# Patient Record
Sex: FEMALE | Race: OTHER | HISPANIC OR LATINO
[De-identification: names, ages, dates, MRNs, and addresses within clinical notes are randomized per-mention and may not be internally consistent; named-entity substitution may affect disease eponyms.]

---

## 2017-03-20 ENCOUNTER — TRANSCRIPTION ENCOUNTER (OUTPATIENT)
Age: 32
End: 2017-03-20

## 2017-03-27 ENCOUNTER — TRANSCRIPTION ENCOUNTER (OUTPATIENT)
Age: 32
End: 2017-03-27

## 2017-06-23 ENCOUNTER — TRANSCRIPTION ENCOUNTER (OUTPATIENT)
Age: 32
End: 2017-06-23

## 2017-09-06 ENCOUNTER — TRANSCRIPTION ENCOUNTER (OUTPATIENT)
Age: 32
End: 2017-09-06

## 2017-09-28 ENCOUNTER — TRANSCRIPTION ENCOUNTER (OUTPATIENT)
Age: 32
End: 2017-09-28

## 2017-10-01 ENCOUNTER — EMERGENCY (EMERGENCY)
Facility: HOSPITAL | Age: 32
LOS: 1 days | Discharge: HOME | End: 2017-10-01
Attending: EMERGENCY MEDICINE | Admitting: EMERGENCY MEDICINE
Payer: MEDICAID

## 2017-10-01 VITALS
TEMPERATURE: 98 F | WEIGHT: 240.08 LBS | HEART RATE: 60 BPM | DIASTOLIC BLOOD PRESSURE: 89 MMHG | HEIGHT: 65 IN | SYSTOLIC BLOOD PRESSURE: 131 MMHG | RESPIRATION RATE: 18 BRPM | OXYGEN SATURATION: 99 %

## 2017-10-01 PROCEDURE — 99284 EMERGENCY DEPT VISIT MOD MDM: CPT

## 2017-10-01 PROCEDURE — 96375 TX/PRO/DX INJ NEW DRUG ADDON: CPT

## 2017-10-01 PROCEDURE — 99284 EMERGENCY DEPT VISIT MOD MDM: CPT | Mod: 25

## 2017-10-01 PROCEDURE — 96374 THER/PROPH/DIAG INJ IV PUSH: CPT

## 2017-10-01 RX ORDER — METOCLOPRAMIDE HCL 10 MG
10 TABLET ORAL ONCE
Qty: 0 | Refills: 0 | Status: COMPLETED | OUTPATIENT
Start: 2017-10-01 | End: 2017-10-01

## 2017-10-01 RX ORDER — KETOROLAC TROMETHAMINE 30 MG/ML
30 SYRINGE (ML) INJECTION ONCE
Qty: 0 | Refills: 0 | Status: DISCONTINUED | OUTPATIENT
Start: 2017-10-01 | End: 2017-10-01

## 2017-10-01 RX ORDER — SODIUM CHLORIDE 9 MG/ML
2000 INJECTION INTRAMUSCULAR; INTRAVENOUS; SUBCUTANEOUS ONCE
Qty: 0 | Refills: 0 | Status: COMPLETED | OUTPATIENT
Start: 2017-10-01 | End: 2017-10-01

## 2017-10-01 RX ADMIN — Medication 10 MILLIGRAM(S): at 23:37

## 2017-10-01 RX ADMIN — SODIUM CHLORIDE 2000 MILLILITER(S): 9 INJECTION INTRAMUSCULAR; INTRAVENOUS; SUBCUTANEOUS at 23:36

## 2017-10-01 NOTE — ED PROVIDER NOTE - OBJECTIVE STATEMENT
31 y/o F presents to ED c/o headache since this morning. Pt states she took Excedrin to no relief. She notes she began feeling numbness to the top of the R side of her head which prompted her visit to the ED today. Denies fever, vomiting, hx of similar sx or any other complaints at this time.

## 2017-10-01 NOTE — ED PROVIDER NOTE - PROGRESS NOTE DETAILS
pt is feeling much better will d/c home. pt currently is improved without sx will d/c home . pt told to f/u with neuro

## 2017-10-01 NOTE — ED ADULT NURSE NOTE - CHPI ED SYMPTOMS NEG
no weakness/no blurred vision/no fever/no vomiting/no nausea/no loss of consciousness/no change in level of consciousness/no confusion/no dizziness

## 2017-10-01 NOTE — ED ADULT NURSE NOTE - OBJECTIVE STATEMENT
Assumed pt care at 2240.  Pt awake alert and oriented x3 c/o headache. Pt states headache began this morning and has gotten progressively worse throughout the day.  Pt states she took excedrin at 1600 today with no relief.  Pt also c/o episode of numbness to right side of head.  Denies nausea or vomiting, denies blurred vision or double vision.  Denies neck pain.  No further complaints.  Respirations even and unlabored.  Denies chest pain.

## 2017-10-01 NOTE — ED ADULT NURSE REASSESSMENT NOTE - NS ED NURSE REASSESS COMMENT FT1
Assumed care pr pt. pt a+ox4, denies any pain or discomfort. pt denies chest pain or SOB. pt denies headache, dizziness or lightheadedness. pt lying comfortably, will continue to monitor.

## 2018-06-08 ENCOUNTER — APPOINTMENT (OUTPATIENT)
Dept: ANTEPARTUM | Facility: CLINIC | Age: 33
End: 2018-06-08
Payer: MEDICAID

## 2018-06-08 ENCOUNTER — ASOB RESULT (OUTPATIENT)
Age: 33
End: 2018-06-08

## 2018-06-08 PROCEDURE — 76830 TRANSVAGINAL US NON-OB: CPT

## 2018-06-08 PROCEDURE — 76857 US EXAM PELVIC LIMITED: CPT

## 2018-08-03 ENCOUNTER — OUTPATIENT (OUTPATIENT)
Dept: OUTPATIENT SERVICES | Facility: HOSPITAL | Age: 33
LOS: 1 days | End: 2018-08-03
Payer: MEDICAID

## 2018-08-03 DIAGNOSIS — Z01.818 ENCOUNTER FOR OTHER PREPROCEDURAL EXAMINATION: ICD-10-CM

## 2018-08-03 LAB
ANION GAP SERPL CALC-SCNC: 13 MMOL/L — SIGNIFICANT CHANGE UP (ref 5–17)
APPEARANCE UR: CLEAR — SIGNIFICANT CHANGE UP
APTT BLD: 35.2 SEC — SIGNIFICANT CHANGE UP (ref 27.5–37.4)
BACTERIA # UR AUTO: ABNORMAL
BASOPHILS # BLD AUTO: 0 K/UL — SIGNIFICANT CHANGE UP (ref 0–0.2)
BASOPHILS NFR BLD AUTO: 0.3 % — SIGNIFICANT CHANGE UP (ref 0–2)
BILIRUB UR-MCNC: NEGATIVE — SIGNIFICANT CHANGE UP
BUN SERPL-MCNC: 9 MG/DL — SIGNIFICANT CHANGE UP (ref 8–20)
CALCIUM SERPL-MCNC: 9.6 MG/DL — SIGNIFICANT CHANGE UP (ref 8.6–10.2)
CHLORIDE SERPL-SCNC: 100 MMOL/L — SIGNIFICANT CHANGE UP (ref 98–107)
CO2 SERPL-SCNC: 25 MMOL/L — SIGNIFICANT CHANGE UP (ref 22–29)
COLOR SPEC: YELLOW — SIGNIFICANT CHANGE UP
CREAT SERPL-MCNC: 0.43 MG/DL — LOW (ref 0.5–1.3)
DIFF PNL FLD: ABNORMAL
EOSINOPHIL # BLD AUTO: 0.2 K/UL — SIGNIFICANT CHANGE UP (ref 0–0.5)
EOSINOPHIL NFR BLD AUTO: 3.3 % — SIGNIFICANT CHANGE UP (ref 0–6)
EPI CELLS # UR: ABNORMAL
GLUCOSE SERPL-MCNC: 98 MG/DL — SIGNIFICANT CHANGE UP (ref 70–115)
GLUCOSE UR QL: NEGATIVE MG/DL — SIGNIFICANT CHANGE UP
HCG UR QL: NEGATIVE — SIGNIFICANT CHANGE UP
HCT VFR BLD CALC: 40.1 % — SIGNIFICANT CHANGE UP (ref 37–47)
HGB BLD-MCNC: 13.4 G/DL — SIGNIFICANT CHANGE UP (ref 12–16)
INR BLD: 1.1 RATIO — SIGNIFICANT CHANGE UP (ref 0.88–1.16)
KETONES UR-MCNC: NEGATIVE — SIGNIFICANT CHANGE UP
LEUKOCYTE ESTERASE UR-ACNC: ABNORMAL
LYMPHOCYTES # BLD AUTO: 1.7 K/UL — SIGNIFICANT CHANGE UP (ref 1–4.8)
LYMPHOCYTES # BLD AUTO: 24.4 % — SIGNIFICANT CHANGE UP (ref 20–55)
MCHC RBC-ENTMCNC: 29.1 PG — SIGNIFICANT CHANGE UP (ref 27–31)
MCHC RBC-ENTMCNC: 33.4 G/DL — SIGNIFICANT CHANGE UP (ref 32–36)
MCV RBC AUTO: 87 FL — SIGNIFICANT CHANGE UP (ref 81–99)
MONOCYTES # BLD AUTO: 0.4 K/UL — SIGNIFICANT CHANGE UP (ref 0–0.8)
MONOCYTES NFR BLD AUTO: 5.2 % — SIGNIFICANT CHANGE UP (ref 3–10)
NEUTROPHILS # BLD AUTO: 4.6 K/UL — SIGNIFICANT CHANGE UP (ref 1.8–8)
NEUTROPHILS NFR BLD AUTO: 66.7 % — SIGNIFICANT CHANGE UP (ref 37–73)
NITRITE UR-MCNC: NEGATIVE — SIGNIFICANT CHANGE UP
PH UR: 5 — SIGNIFICANT CHANGE UP (ref 5–8)
PLATELET # BLD AUTO: 297 K/UL — SIGNIFICANT CHANGE UP (ref 150–400)
POTASSIUM SERPL-MCNC: 4.1 MMOL/L — SIGNIFICANT CHANGE UP (ref 3.5–5.3)
POTASSIUM SERPL-SCNC: 4.1 MMOL/L — SIGNIFICANT CHANGE UP (ref 3.5–5.3)
PROT UR-MCNC: 100 MG/DL
PROTHROM AB SERPL-ACNC: 12.1 SEC — SIGNIFICANT CHANGE UP (ref 9.8–12.7)
RBC # BLD: 4.61 M/UL — SIGNIFICANT CHANGE UP (ref 4.4–5.2)
RBC # FLD: 12.9 % — SIGNIFICANT CHANGE UP (ref 11–15.6)
RBC CASTS # UR COMP ASSIST: ABNORMAL /HPF (ref 0–4)
SODIUM SERPL-SCNC: 138 MMOL/L — SIGNIFICANT CHANGE UP (ref 135–145)
SP GR SPEC: 1.01 — SIGNIFICANT CHANGE UP (ref 1.01–1.02)
UROBILINOGEN FLD QL: NEGATIVE MG/DL — SIGNIFICANT CHANGE UP
WBC # BLD: 7 K/UL — SIGNIFICANT CHANGE UP (ref 4.8–10.8)
WBC # FLD AUTO: 7 K/UL — SIGNIFICANT CHANGE UP (ref 4.8–10.8)
WBC UR QL: SIGNIFICANT CHANGE UP

## 2018-08-03 PROCEDURE — 71046 X-RAY EXAM CHEST 2 VIEWS: CPT

## 2018-08-03 PROCEDURE — 87086 URINE CULTURE/COLONY COUNT: CPT

## 2018-08-03 PROCEDURE — 80048 BASIC METABOLIC PNL TOTAL CA: CPT

## 2018-08-03 PROCEDURE — 85027 COMPLETE CBC AUTOMATED: CPT

## 2018-08-03 PROCEDURE — 85610 PROTHROMBIN TIME: CPT

## 2018-08-03 PROCEDURE — 81025 URINE PREGNANCY TEST: CPT

## 2018-08-03 PROCEDURE — 85730 THROMBOPLASTIN TIME PARTIAL: CPT

## 2018-08-03 PROCEDURE — 81001 URINALYSIS AUTO W/SCOPE: CPT

## 2018-08-03 PROCEDURE — 36415 COLL VENOUS BLD VENIPUNCTURE: CPT

## 2018-08-03 PROCEDURE — G0463: CPT

## 2018-08-03 PROCEDURE — 71046 X-RAY EXAM CHEST 2 VIEWS: CPT | Mod: 26

## 2018-08-06 LAB
CULTURE RESULTS: SIGNIFICANT CHANGE UP
SPECIMEN SOURCE: SIGNIFICANT CHANGE UP

## 2018-08-14 ENCOUNTER — RESULT REVIEW (OUTPATIENT)
Age: 33
End: 2018-08-14

## 2019-01-04 ENCOUNTER — EMERGENCY (EMERGENCY)
Facility: HOSPITAL | Age: 34
LOS: 1 days | Discharge: HOME | End: 2019-01-04
Attending: EMERGENCY MEDICINE
Payer: MEDICAID

## 2019-01-04 VITALS
TEMPERATURE: 97 F | DIASTOLIC BLOOD PRESSURE: 84 MMHG | SYSTOLIC BLOOD PRESSURE: 130 MMHG | RESPIRATION RATE: 18 BRPM | HEART RATE: 96 BPM | OXYGEN SATURATION: 97 %

## 2019-01-04 VITALS — TEMPERATURE: 98 F

## 2019-01-04 LAB
ALBUMIN SERPL ELPH-MCNC: 3.8 G/DL — SIGNIFICANT CHANGE UP (ref 3.3–5.2)
ALP SERPL-CCNC: 105 U/L — SIGNIFICANT CHANGE UP (ref 40–120)
ALT FLD-CCNC: 27 U/L — SIGNIFICANT CHANGE UP
ANION GAP SERPL CALC-SCNC: 15 MMOL/L — SIGNIFICANT CHANGE UP (ref 5–17)
APPEARANCE UR: CLEAR — SIGNIFICANT CHANGE UP
AST SERPL-CCNC: 32 U/L — HIGH
BASOPHILS # BLD AUTO: 0 K/UL — SIGNIFICANT CHANGE UP (ref 0–0.2)
BASOPHILS NFR BLD AUTO: 0.3 % — SIGNIFICANT CHANGE UP (ref 0–2)
BILIRUB SERPL-MCNC: <0.2 MG/DL — LOW (ref 0.4–2)
BILIRUB UR-MCNC: NEGATIVE — SIGNIFICANT CHANGE UP
BUN SERPL-MCNC: 13 MG/DL — SIGNIFICANT CHANGE UP (ref 8–20)
CALCIUM SERPL-MCNC: 9.1 MG/DL — SIGNIFICANT CHANGE UP (ref 8.6–10.2)
CHLORIDE SERPL-SCNC: 106 MMOL/L — SIGNIFICANT CHANGE UP (ref 98–107)
CO2 SERPL-SCNC: 20 MMOL/L — LOW (ref 22–29)
COLOR SPEC: YELLOW — SIGNIFICANT CHANGE UP
CREAT SERPL-MCNC: 0.46 MG/DL — LOW (ref 0.5–1.3)
D DIMER BLD IA.RAPID-MCNC: <150 NG/ML DDU — SIGNIFICANT CHANGE UP
DIFF PNL FLD: NEGATIVE — SIGNIFICANT CHANGE UP
EOSINOPHIL # BLD AUTO: 0.2 K/UL — SIGNIFICANT CHANGE UP (ref 0–0.5)
EOSINOPHIL NFR BLD AUTO: 2.5 % — SIGNIFICANT CHANGE UP (ref 0–6)
EPI CELLS # UR: SIGNIFICANT CHANGE UP
GLUCOSE SERPL-MCNC: 132 MG/DL — HIGH (ref 70–115)
GLUCOSE UR QL: NEGATIVE MG/DL — SIGNIFICANT CHANGE UP
HCG SERPL-ACNC: <4 MIU/ML — SIGNIFICANT CHANGE UP
HCT VFR BLD CALC: 40.5 % — SIGNIFICANT CHANGE UP (ref 37–47)
HGB BLD-MCNC: 13.1 G/DL — SIGNIFICANT CHANGE UP (ref 12–16)
KETONES UR-MCNC: NEGATIVE — SIGNIFICANT CHANGE UP
LEUKOCYTE ESTERASE UR-ACNC: NEGATIVE — SIGNIFICANT CHANGE UP
LYMPHOCYTES # BLD AUTO: 2.1 K/UL — SIGNIFICANT CHANGE UP (ref 1–4.8)
LYMPHOCYTES # BLD AUTO: 27.8 % — SIGNIFICANT CHANGE UP (ref 20–55)
MCHC RBC-ENTMCNC: 27.7 PG — SIGNIFICANT CHANGE UP (ref 27–31)
MCHC RBC-ENTMCNC: 32.3 G/DL — SIGNIFICANT CHANGE UP (ref 32–36)
MCV RBC AUTO: 85.6 FL — SIGNIFICANT CHANGE UP (ref 81–99)
MONOCYTES # BLD AUTO: 0.4 K/UL — SIGNIFICANT CHANGE UP (ref 0–0.8)
MONOCYTES NFR BLD AUTO: 5.4 % — SIGNIFICANT CHANGE UP (ref 3–10)
NEUTROPHILS # BLD AUTO: 4.8 K/UL — SIGNIFICANT CHANGE UP (ref 1.8–8)
NEUTROPHILS NFR BLD AUTO: 63.9 % — SIGNIFICANT CHANGE UP (ref 37–73)
NITRITE UR-MCNC: NEGATIVE — SIGNIFICANT CHANGE UP
PH UR: 6 — SIGNIFICANT CHANGE UP (ref 5–8)
PLATELET # BLD AUTO: 295 K/UL — SIGNIFICANT CHANGE UP (ref 150–400)
POTASSIUM SERPL-MCNC: 4.4 MMOL/L — SIGNIFICANT CHANGE UP (ref 3.5–5.3)
POTASSIUM SERPL-SCNC: 4.4 MMOL/L — SIGNIFICANT CHANGE UP (ref 3.5–5.3)
PROT SERPL-MCNC: 7.7 G/DL — SIGNIFICANT CHANGE UP (ref 6.6–8.7)
PROT UR-MCNC: NEGATIVE MG/DL — SIGNIFICANT CHANGE UP
RBC # BLD: 4.73 M/UL — SIGNIFICANT CHANGE UP (ref 4.4–5.2)
RBC # FLD: 12.7 % — SIGNIFICANT CHANGE UP (ref 11–15.6)
RBC CASTS # UR COMP ASSIST: SIGNIFICANT CHANGE UP /HPF (ref 0–4)
SODIUM SERPL-SCNC: 141 MMOL/L — SIGNIFICANT CHANGE UP (ref 135–145)
SP GR SPEC: 1.01 — SIGNIFICANT CHANGE UP (ref 1.01–1.02)
TROPONIN T SERPL-MCNC: <0.01 NG/ML — SIGNIFICANT CHANGE UP (ref 0–0.06)
UROBILINOGEN FLD QL: NEGATIVE MG/DL — SIGNIFICANT CHANGE UP
WBC # BLD: 7.5 K/UL — SIGNIFICANT CHANGE UP (ref 4.8–10.8)
WBC # FLD AUTO: 7.5 K/UL — SIGNIFICANT CHANGE UP (ref 4.8–10.8)
WBC UR QL: SIGNIFICANT CHANGE UP

## 2019-01-04 PROCEDURE — 85027 COMPLETE CBC AUTOMATED: CPT

## 2019-01-04 PROCEDURE — 99285 EMERGENCY DEPT VISIT HI MDM: CPT

## 2019-01-04 PROCEDURE — 99284 EMERGENCY DEPT VISIT MOD MDM: CPT | Mod: 25

## 2019-01-04 PROCEDURE — 71046 X-RAY EXAM CHEST 2 VIEWS: CPT

## 2019-01-04 PROCEDURE — 80053 COMPREHEN METABOLIC PANEL: CPT

## 2019-01-04 PROCEDURE — 84484 ASSAY OF TROPONIN QUANT: CPT

## 2019-01-04 PROCEDURE — 84702 CHORIONIC GONADOTROPIN TEST: CPT

## 2019-01-04 PROCEDURE — 85379 FIBRIN DEGRADATION QUANT: CPT

## 2019-01-04 PROCEDURE — 71046 X-RAY EXAM CHEST 2 VIEWS: CPT | Mod: 26

## 2019-01-04 PROCEDURE — 82550 ASSAY OF CK (CPK): CPT

## 2019-01-04 PROCEDURE — 93010 ELECTROCARDIOGRAM REPORT: CPT

## 2019-01-04 PROCEDURE — 93005 ELECTROCARDIOGRAM TRACING: CPT

## 2019-01-04 PROCEDURE — 36415 COLL VENOUS BLD VENIPUNCTURE: CPT

## 2019-01-04 PROCEDURE — 81001 URINALYSIS AUTO W/SCOPE: CPT

## 2019-01-04 RX ORDER — ACETAMINOPHEN 500 MG
975 TABLET ORAL ONCE
Qty: 0 | Refills: 0 | Status: DISCONTINUED | OUTPATIENT
Start: 2019-01-04 | End: 2019-01-04

## 2019-01-04 NOTE — ED ADULT TRIAGE NOTE - CHIEF COMPLAINT QUOTE
10mins pta I was at home not really doing anything and I felt my heart racing fast I got chest tightness and it was hard for me to breath

## 2019-01-04 NOTE — ED ADULT NURSE NOTE - OBJECTIVE STATEMENT
Pt c/o sternal chest pain prior to arrival, denies at this time, denies SOB, states "was getting my bed ready to lay down and then it started", denies N/V/D

## 2019-01-04 NOTE — ED STATDOCS - PROGRESS NOTE DETAILS
34 y/o F pt menorrhagia and migraines presents to ED c/o CP (lasted approximately 1 hour) and numbness in right arm. She indicates at 19:45 today went to her room to lay down, developed chest discomfort, went to get up and felt sharp chest pressure. Had similar symptoms years ago; negative cardiology workup. Currently has no pain. Denies leg pain or recent travel. Denies cough. NKDA. Patient is a nonsmoker. Will be transferred to [facility] for further evaluation provider.

## 2019-01-04 NOTE — ED ADULT NURSE NOTE - NSIMPLEMENTINTERV_GEN_ALL_ED
Implemented All Universal Safety Interventions:  [place] to call system. Call bell, personal items and telephone within reach. Instruct patient to call for assistance. Room bathroom lighting operational. Non-slip footwear when patient is off stretcher. Physically safe environment: no spills, clutter or unnecessary equipment. Stretcher in lowest position, wheels locked, appropriate side rails in place.

## 2019-01-04 NOTE — ED ADULT NURSE NOTE - CAS EDN DISCHARGE ASSESSMENT
History of Present Illness:  Shannon Krishna is here for cerumen impaction.    Physical Examination:  The right ear was examined under the microscope.  There was noted to be a cerumen impaction.  It was cleaned with right angle hook, curette and alligator forceps.  The TM is otherwise clear.  The opposite ear was also examined under the microscope and noted to have a cerumen impaction.  It was cleaned with right angle hook, curette and alligator forceps.  The TM is otherwise clear.  The patient noted improvement of symptoms.    Plan:  Return as needed/scheduled in six months for cleaning.      SL/ms    
Alert and oriented to person, place and time

## 2019-01-05 NOTE — ED PROVIDER NOTE - CARDIAC, MLM
Normal rate, regular rhythm.  Heart sounds S1, S2.  No murmurs, rubs or gallops. 2+ radial pulses equal b/l UE.

## 2019-01-05 NOTE — ED PROVIDER NOTE - OBJECTIVE STATEMENT
Pt is a 33yoF with no known PMH ? distant history of tachycardia, pw episode of chest pressure that onset while making her bed tonight, associated with rapid heart rate , SOB and feeling faint. Pt states that she had some pain and numbness radiating down the R arm concomitant w/ symptoms. Symptoms in total lasted about 20 minutes and then resolved completely.  She denies any meds/ OCPs/ recent travel/ leg pain/ calf swelling/ caffeine or supplement intake/ smoking.  LMP 12/23.  Pt unsure of FH as she is adopted. Currently has no other complaints and feels back to normal.

## 2019-01-05 NOTE — ED PROVIDER NOTE - MEDICAL DECISION MAKING DETAILS
Pt presents with episode of palpitations/ tachycardia associated w/ chest pain/ lightheadedness now resolved.  EKG NSR with occasional PVCs;  Plan to check labs, electrolytes, monitor, and if stable likely d/c with outpt cardiology f/u

## 2019-02-21 ENCOUNTER — APPOINTMENT (OUTPATIENT)
Dept: OBGYN | Facility: CLINIC | Age: 34
End: 2019-02-21
Payer: MEDICAID

## 2019-02-21 VITALS
SYSTOLIC BLOOD PRESSURE: 131 MMHG | HEIGHT: 63 IN | WEIGHT: 248.56 LBS | DIASTOLIC BLOOD PRESSURE: 84 MMHG | BODY MASS INDEX: 44.04 KG/M2 | HEART RATE: 80 BPM

## 2019-02-21 DIAGNOSIS — Z86.39 PERSONAL HISTORY OF OTHER ENDOCRINE, NUTRITIONAL AND METABOLIC DISEASE: ICD-10-CM

## 2019-02-21 DIAGNOSIS — Z78.9 OTHER SPECIFIED HEALTH STATUS: ICD-10-CM

## 2019-02-21 PROCEDURE — 99204 OFFICE O/P NEW MOD 45 MIN: CPT

## 2019-02-21 PROCEDURE — 36415 COLL VENOUS BLD VENIPUNCTURE: CPT

## 2019-02-22 LAB
BASOPHILS # BLD AUTO: 0.03 K/UL
BASOPHILS NFR BLD AUTO: 0.5 %
EOSINOPHIL # BLD AUTO: 0.23 K/UL
EOSINOPHIL NFR BLD AUTO: 3.7 %
HBA1C MFR BLD HPLC: 5.2 %
HCT VFR BLD CALC: 43.9 %
HGB BLD-MCNC: 13.7 G/DL
IMM GRANULOCYTES NFR BLD AUTO: 0 %
LYMPHOCYTES # BLD AUTO: 1.93 K/UL
LYMPHOCYTES NFR BLD AUTO: 30.7 %
MAN DIFF?: NORMAL
MCHC RBC-ENTMCNC: 28.1 PG
MCHC RBC-ENTMCNC: 31.2 GM/DL
MCV RBC AUTO: 90.1 FL
MONOCYTES # BLD AUTO: 0.33 K/UL
MONOCYTES NFR BLD AUTO: 5.3 %
NEUTROPHILS # BLD AUTO: 3.76 K/UL
NEUTROPHILS NFR BLD AUTO: 59.8 %
PLATELET # BLD AUTO: 332 K/UL
RBC # BLD: 4.87 M/UL
RBC # FLD: 13.3 %
WBC # FLD AUTO: 6.28 K/UL

## 2019-02-25 LAB
C TRACH RRNA SPEC QL NAA+PROBE: NOT DETECTED
FSH SERPL-MCNC: 2 IU/L
HPV HIGH+LOW RISK DNA PNL CVX: NOT DETECTED
LH SERPL-ACNC: 2.7 IU/L
N GONORRHOEA RRNA SPEC QL NAA+PROBE: NOT DETECTED
PROLACTIN SERPL-MCNC: 9.3 NG/ML
SOURCE TP AMPLIFICATION: NORMAL
T3FREE SERPL-MCNC: 3.19 PG/ML
T4 FREE SERPL-MCNC: 1.1 NG/DL
TESTOST SERPL-MCNC: 14.1 NG/DL
TSH SERPL-ACNC: 4.02 UIU/ML

## 2019-02-27 LAB — CYTOLOGY CVX/VAG DOC THIN PREP: NORMAL

## 2019-03-21 ENCOUNTER — APPOINTMENT (OUTPATIENT)
Dept: OBGYN | Facility: CLINIC | Age: 34
End: 2019-03-21
Payer: MEDICAID

## 2019-03-21 VITALS
SYSTOLIC BLOOD PRESSURE: 131 MMHG | DIASTOLIC BLOOD PRESSURE: 84 MMHG | HEIGHT: 63 IN | BODY MASS INDEX: 43.69 KG/M2 | WEIGHT: 246.56 LBS

## 2019-03-21 PROCEDURE — 99213 OFFICE O/P EST LOW 20 MIN: CPT

## 2019-05-30 ENCOUNTER — TRANSCRIPTION ENCOUNTER (OUTPATIENT)
Age: 34
End: 2019-05-30

## 2019-06-05 ENCOUNTER — APPOINTMENT (OUTPATIENT)
Dept: FAMILY MEDICINE | Facility: CLINIC | Age: 34
End: 2019-06-05
Payer: MEDICAID

## 2019-06-05 ENCOUNTER — APPOINTMENT (OUTPATIENT)
Dept: DERMATOLOGY | Facility: CLINIC | Age: 34
End: 2019-06-05
Payer: MEDICAID

## 2019-06-05 VITALS
SYSTOLIC BLOOD PRESSURE: 130 MMHG | HEIGHT: 62 IN | WEIGHT: 250 LBS | BODY MASS INDEX: 46.01 KG/M2 | HEART RATE: 78 BPM | DIASTOLIC BLOOD PRESSURE: 86 MMHG

## 2019-06-05 DIAGNOSIS — M25.561 PAIN IN RIGHT KNEE: ICD-10-CM

## 2019-06-05 LAB
HCG UR QL: NEGATIVE
QUALITY CONTROL: YES

## 2019-06-05 PROCEDURE — 99385 PREV VISIT NEW AGE 18-39: CPT | Mod: 25

## 2019-06-05 PROCEDURE — 99202 OFFICE O/P NEW SF 15 MIN: CPT

## 2019-06-05 PROCEDURE — 99213 OFFICE O/P EST LOW 20 MIN: CPT

## 2019-06-05 PROCEDURE — 81025 URINE PREGNANCY TEST: CPT

## 2019-06-05 NOTE — COUNSELING
[Weight management counseling provided] : Weight management [Healthy eating counseling provided] : healthy eating [Activity counseling provided] : activity [Decrease Portions] : Decrease food portions [None] : None [Walking] : Walking [Good understanding] : Patient has a good understanding of lifestyle changes and the steps needed to achieve self management goals

## 2019-06-05 NOTE — REVIEW OF SYSTEMS
[Negative] : Heme/Lymph [Joint Pain] : joint pain [Muscle Pain] : no muscle pain [Back Pain] : no back pain

## 2019-06-05 NOTE — HEALTH RISK ASSESSMENT
[Good] : ~his/her~  mood as  good [0] : 2) Feeling down, depressed, or hopeless: Not at all (0) [With Family] : lives with family [] :  [# Of Children ___] : has [unfilled] children [One fall no injury in past year] : Patient reported one fall in the past year without injury [HIV test declined] : HIV test declined [Hepatitis C test declined] : Hepatitis C test declined [None] : None [# of Members in Household ___] :  household currently consist of [unfilled] member(s) [Unemployed] : unemployed [Less Than High School] : less than high school [Sexually Active] : sexually active [Feels Safe at Home] : Feels safe at home [Fully functional (bathing, dressing, toileting, transferring, walking, feeding)] : Fully functional (bathing, dressing, toileting, transferring, walking, feeding) [Reports normal functional visual acuity (ie: able to read med bottle)] : Reports normal functional visual acuity [Fully functional (using the telephone, shopping, preparing meals, housekeeping, doing laundry, using] : Fully functional and needs no help or supervision to perform IADLs (using the telephone, shopping, preparing meals, housekeeping, doing laundry, using transportation, managing medications and managing finances) [Carbon Monoxide Detector] : carbon monoxide detector [Smoke Detector] : smoke detector [Safety elements used in home] : safety elements used in home [Seat Belt] :  uses seat belt [Sunscreen] : uses sunscreen [With Patient/Caregiver] : With Patient/Caregiver [Patient reported PAP Smear was normal] : Patient reported PAP Smear was normal [] : No [de-identified] : ROOM FOR IMPROVEMENT, PLANS TO RESTART GOING TO THE GYM [de-identified] : GOOD- ROOM FOR IMPROVEMENT [[id number]] : 0 [BAR2Psdmv] : 0 [Change in mental status noted] : No change in mental status noted [Language] : denies difficulty with language [Behavior] : denies difficulty with behavior [Learning/Retaining New Information] : denies difficulty learning/retaining new information [Handling Complex Tasks] : denies difficulty handling complex tasks [Reasoning] : denies difficulty with reasoning [Spatial Ability and Orientation] : denies difficulty with spatial ability and orientation [High Risk Behavior] : no high risk behavior [Reports changes in hearing] : Reports no changes in hearing [Reports changes in vision] : Reports no changes in vision [PapSmearDate] : 02/19 [de-identified] : GLASSES FOR READING [AdvancecareDate] : 6/19

## 2019-06-05 NOTE — PLAN
[FreeTextEntry1] : CHECK ROUTINE LAB WORK\par CONSIDER TDAP\par FLU VACCINE IN THE FALLS\par HEALTHY DIET AND LIFESTYLE MODIFICATIONS\par HEALTHY WEIGHT LOSS \par VISION SCREENING\par FOLLOW UP WITH ALL SPECIALISTS AS DIRECTED\par \par \par POCT- URINE PREGNANCY; NEGATIVE\par AVOID ACTIVITIES CAUSING PAIN, FALL PRECAUTIONS\par XRAY KNEE DUE TO PERSISTENCE OF PAIN \par CALL WITH ANY QUESTIONS, CONCERNS OR CHANGES

## 2019-06-05 NOTE — PHYSICAL EXAM
[No Acute Distress] : no acute distress [Well Nourished] : well nourished [Well-Appearing] : well-appearing [Supple] : supple [No Lymphadenopathy] : no lymphadenopathy [No Accessory Muscle Use] : no accessory muscle use [Clear to Auscultation] : lungs were clear to auscultation bilaterally [No Respiratory Distress] : no respiratory distress  [Regular Rhythm] : with a regular rhythm [Normal Rate] : normal rate  [Normal S1, S2] : normal S1 and S2 [No Murmur] : no murmur heard [Pedal Pulses Present] : the pedal pulses are present [No Edema] : there was no peripheral edema [Soft] : abdomen soft [Normal Bowel Sounds] : normal bowel sounds [Non Tender] : non-tender [Normal Gait] : normal gait [Coordination Grossly Intact] : coordination grossly intact [Deep Tendon Reflexes (DTR)] : deep tendon reflexes were 2+ and symmetric [No Focal Deficits] : no focal deficits [Speech Grossly Normal] : speech grossly normal [Normal Affect] : the affect was normal [Normal Insight/Judgement] : insight and judgment were intact [Normal Mood] : the mood was normal [Normal Sclera/Conjunctiva] : normal sclera/conjunctiva [PERRL] : pupils equal round and reactive to light [Normal Outer Ear/Nose] : the outer ears and nose were normal in appearance [Normal Oropharynx] : the oropharynx was normal [Normal TMs] : both tympanic membranes were normal [No Joint Swelling] : no joint swelling [Grossly Normal Strength/Tone] : grossly normal strength/tone [No Rash] : no rash [de-identified] : NON-TENDER.  GOOD ROM.  DISTAL PULSES INTACT

## 2019-07-31 ENCOUNTER — APPOINTMENT (OUTPATIENT)
Dept: FAMILY MEDICINE | Facility: CLINIC | Age: 34
End: 2019-07-31
Payer: MEDICAID

## 2019-07-31 VITALS
BODY MASS INDEX: 45.64 KG/M2 | HEART RATE: 82 BPM | SYSTOLIC BLOOD PRESSURE: 130 MMHG | DIASTOLIC BLOOD PRESSURE: 82 MMHG | HEIGHT: 62 IN | WEIGHT: 248 LBS

## 2019-07-31 DIAGNOSIS — M62.838 OTHER MUSCLE SPASM: ICD-10-CM

## 2019-07-31 PROCEDURE — 99214 OFFICE O/P EST MOD 30 MIN: CPT

## 2019-08-02 ENCOUNTER — MEDICATION RENEWAL (OUTPATIENT)
Age: 34
End: 2019-08-02

## 2019-08-06 ENCOUNTER — APPOINTMENT (OUTPATIENT)
Dept: FAMILY MEDICINE | Facility: CLINIC | Age: 34
End: 2019-08-06
Payer: MEDICAID

## 2019-08-06 VITALS
BODY MASS INDEX: 45.64 KG/M2 | HEART RATE: 80 BPM | HEIGHT: 62 IN | SYSTOLIC BLOOD PRESSURE: 120 MMHG | DIASTOLIC BLOOD PRESSURE: 90 MMHG | WEIGHT: 248 LBS

## 2019-08-06 PROCEDURE — 99214 OFFICE O/P EST MOD 30 MIN: CPT | Mod: 25

## 2019-08-06 PROCEDURE — 96127 BRIEF EMOTIONAL/BEHAV ASSMT: CPT

## 2019-08-06 NOTE — PHYSICAL EXAM
[No Acute Distress] : no acute distress [Well Nourished] : well nourished [Well-Appearing] : well-appearing [Well Developed] : well developed [PERRL] : pupils equal round and reactive to light [Normal Sclera/Conjunctiva] : normal sclera/conjunctiva [EOMI] : extraocular movements intact [Normal Outer Ear/Nose] : the outer ears and nose were normal in appearance [No JVD] : no jugular venous distention [Normal Oropharynx] : the oropharynx was normal [No Lymphadenopathy] : no lymphadenopathy [Supple] : supple [Thyroid Normal, No Nodules] : the thyroid was normal and there were no nodules present [No Accessory Muscle Use] : no accessory muscle use [No Respiratory Distress] : no respiratory distress  [Clear to Auscultation] : lungs were clear to auscultation bilaterally [Normal Rate] : normal rate  [Regular Rhythm] : with a regular rhythm [Normal S1, S2] : normal S1 and S2 [No Carotid Bruits] : no carotid bruits [No Murmur] : no murmur heard [No Varicosities] : no varicosities [No Abdominal Bruit] : a ~M bruit was not heard ~T in the abdomen [No Edema] : there was no peripheral edema [Pedal Pulses Present] : the pedal pulses are present [No Extremity Clubbing/Cyanosis] : no extremity clubbing/cyanosis [No Palpable Aorta] : no palpable aorta [Soft] : abdomen soft [Non-distended] : non-distended [Non Tender] : non-tender [Normal Bowel Sounds] : normal bowel sounds [No HSM] : no HSM [No Masses] : no abdominal mass palpated [Normal Posterior Cervical Nodes] : no posterior cervical lymphadenopathy [No CVA Tenderness] : no CVA  tenderness [Normal Anterior Cervical Nodes] : no anterior cervical lymphadenopathy [No Spinal Tenderness] : no spinal tenderness [Grossly Normal Strength/Tone] : grossly normal strength/tone [No Joint Swelling] : no joint swelling [No Rash] : no rash [Coordination Grossly Intact] : coordination grossly intact [No Focal Deficits] : no focal deficits [Deep Tendon Reflexes (DTR)] : deep tendon reflexes were 2+ and symmetric [Normal Affect] : the affect was normal [Normal Gait] : normal gait [Normal Insight/Judgement] : insight and judgment were intact [de-identified] : Muscle spasm

## 2019-08-06 NOTE — HISTORY OF PRESENT ILLNESS
[FreeTextEntry8] : headache on the right side of headache 10+ years and feels cramps,no nausea or vomiting\par gets dizzy at times pain start from lower neck and shoots up\par has done PT in the past and has not done sincerely

## 2019-08-06 NOTE — PLAN
[FreeTextEntry1] : # start Flexaril 7.5 mg daily\par # pT\par # advise weight loss may consider medical weight loss\par follow up if no improvement in 2 weeks

## 2019-08-07 RX ORDER — CYCLOBENZAPRINE HYDROCHLORIDE 5 MG/1
5 TABLET, FILM COATED ORAL
Qty: 10 | Refills: 0 | Status: DISCONTINUED | COMMUNITY
Start: 2019-07-31 | End: 2019-08-07

## 2019-08-07 NOTE — PLAN
[FreeTextEntry1] : RECENT LAB WORK REVIEWED\par HEALTHY DIET AND LIFESTYLE MODIFICATIONS\par HEALTHY WEIGHT LOSS COUNSELING PROVIDED\par KEEP FOOD DIARY AND BRING TO NUTRITIONIST\par INCREASE PHYSICAL ACTIVITY\par REFERRAL TO ENDOCRINOLOGY FOR ADDITIONAL METABOLIC TESTING\par WILL ALSO REFER TO FERTILITY SPECIALIST DUE TO INABILITY TO CONCEIVE OVER TWO YEARS\par SUPPORT GIVEN\par CALL WITH ANY QUESTIONS, CONCERNS OR CHANGES.\par

## 2019-08-07 NOTE — COUNSELING
[Potential consequences of obesity discussed] : Potential consequences of obesity discussed [Benefits of weight loss discussed] : Benefits of weight loss discussed [Encouraged to maintain food diary] : Encouraged to maintain food diary [Encouraged to increase physical activity] : Encouraged to increase physical activity [Good understanding] : Patient has a good understanding of disease, goals and obesity follow-up plan [FreeTextEntry4] : 15

## 2019-08-07 NOTE — PHYSICAL EXAM
[No Acute Distress] : no acute distress [Well-Appearing] : well-appearing [Well Nourished] : well nourished [Supple] : supple [No Accessory Muscle Use] : no accessory muscle use [No Respiratory Distress] : no respiratory distress  [Clear to Auscultation] : lungs were clear to auscultation bilaterally [Normal Rate] : normal rate  [Regular Rhythm] : with a regular rhythm [Normal S1, S2] : normal S1 and S2 [No Murmur] : no murmur heard [Pedal Pulses Present] : the pedal pulses are present [Soft] : abdomen soft [No Edema] : there was no peripheral edema [Normal Bowel Sounds] : normal bowel sounds [Non Tender] : non-tender [Normal Sclera/Conjunctiva] : normal sclera/conjunctiva [PERRL] : pupils equal round and reactive to light [No Lymphadenopathy] : no lymphadenopathy [No Joint Swelling] : no joint swelling [Grossly Normal Strength/Tone] : grossly normal strength/tone [Speech Grossly Normal] : speech grossly normal [Normal Mood] : the mood was normal [Normal Insight/Judgement] : insight and judgment were intact

## 2019-08-07 NOTE — HISTORY OF PRESENT ILLNESS
[FreeTextEntry1] : F/U OBESITY [de-identified] : MS. ZAMUDIO IS A PLEASANT 35 YO PRESENTING FOR FOLLOW UP. SHE COMPLAINS OF HAVING TROUBLE LOSING WEIGHT. CHRONIC HISTORY OF OBESITY.  SHE HAS MADE DIET CHANGES FOR THE PAST FEW MONTHS - SHE DOES NOT DRINK JUICE OR SODA, JUST WATER. TRYING TO MAKE HEALTHIER CHOICES.  WATCHING PORTIONS.  CUT DOWN ON CARBOHYDRATE INTAKE.  EATING PROTEIN.  NOT EATING LATE AT NIGHT.  SHE IS EXERCISING AT THE GYM 2-3 TIMES A WEEK - DOES CARDIO. PRIOR ENDOCRINOLOGY EVALUATION YEARS AGO.  HAS BEEN TRYING TO GET PREGNANT FOR THE PAST TWO YEARS WITHOUT SUCCESS.  UP TO DATE WITH GYN.  HAS NEVER SEEN INFERTILITY SPECIALIST.  HAS HAD NO HEADACHES, DIZZINESS, CHEST PAIN, SHORTNESS OF BREATH, GI OR URINARY COMPLAINTS. NO OTHER COMPLAINTS TODAY.

## 2019-08-26 NOTE — HISTORY OF PRESENT ILLNESS
Brian Young      Post Operative Instructions for Reverse Shoulder Replacement       Post operative appointment with Dr. Regalado then physical therapy to follow:     Place: [facility]  Date: 10-4-19  Time: 9:50am     Dressing/Bandage:   1. Keep bandages clean and dry. A small amount of bloody drainage on the dressing is normal.    2. You may remove your dressing in 3 days.  At that time you may resume showering.  Bruising and discoloration in shoulder and neck is expected after surgery.    3.   No soaking surgical arm in a bathtub for 3 weeks.  Avoid hot tubs or                                 swimming pools for 6 weeks.    4.   Do not apply lotions, ointments, or soaps directly over incision for 3 weeks.     Activity:   1. If a nerve block was done, all sensation returns usually by 24 hours.    2.   Wear sling on the surgical arm for 3 weeks.    3.   To prevent finger stiffness and swelling, begin exercising wrist and fingers                        the day of surgery by gently opening and closing your hand frequently.    4.   Use ice to shoulder for 20 minutes for the first 48 hours intermittently.    5.   You may resume driving when you are off of your pain medications.    Medications:  1. A prescription for pain medication will be issued for post-surgical pain. Use   only as needed for pain. Narcotics will make you dizzy and drowsy, be careful, and take your time with position changes.  Do NOT drive or drink alcohol while on these medications.  Take these medications with food. Carbohydrates will help so you do not get sick to your stomach while taking these medications.  It is normal, and should be expected, to have pain even though you are taking pain medications, pain medications are meant to take the edge off. If you are unable to perform your exercises because of discomfort, you need to take your pain medications.  Plan your exercises 1 hour after taking the pain medication  to get the full effect of the medication.               2. The anesthesia numbness will wear off around 4 to 24 hours after surgery is  completed.     3. You may take over the counter stool softeners (Ducolax, Senna, or Milk of Magnesium) as needed for constipation.      If you notice or experience the following call [phone]:    1. Heavy bleeding that soaks through your dressing.     2. Redness, swelling and/or pus at the incision sites.  Fever with a temperature over 101 degrees F.       3.  SEVERE pain not relieved by prescribed pain medications.    Aftercare:  Please note that for 2 years post shoulder joint replacement, you will require a pre-dental antibiotic for any dental cleaning or procedure.  Please call our office in advance for a prescription.      If you had general anesthesia, it is important to keep your lungs clear and expanded, by taking deep breaths every 2 hours for 24 hours, while awake.  If you smoke or have a history of lung problems, take 3 deep breaths and cough every 2 hours for 24 hours, while awake.  Also, until you are back to your normal activity level, it is important to maintain good circulation by being up and around at home, but not in excess.  This is even more important if you have a history of blood clots.    If you have been given an incentive spirometer at discharge:  Use your incentive spirometer every 2 hours while awake for the first 2 days, take 5-10 slow, deep breaths in at every use.  This will help expand your lungs to help prevent pneumonia.   [FreeTextEntry1] : ESTABLISH CARE [de-identified] : MS. ZAMUDIO IS A PLEASANT 33 YO PRESENTING TO ESTABLISH CARE. FOLLOWS WITH DERMATOLOGY - LAST SEEN THIS MORNING.  ROUTINELY SEEING GYN AS WELL.  WOULD LIKE TO LOOSE WEIGHT.  PLANS TO TRY TO IMPROVE DIET AND BECOME MORE ACTIVE.   HAS HAD NO HEADACHE, DIZZINESS, CHEST PAIN, SHORTNESS OF BREATH, GI OR URINARY COMPLAINTS.  NO OTHER COMPLAINTS TODAY.  \par DERM: DR. WILLIAMSON\par GYN: DEVORAH\par \par TODAY ALSO HERE FOR ACUTE VISIT.  HAS HAD PAIN IN HER RIGHT KNEE WAXING AND WANING FOR OVER A YEAR.  TWO ACCIDENTAL FALLS ON KNEE.  LAST OCCURRED ONE YEAR AGO.  NO EDEMA.  DENIES NUMBNESS/TINGLING EXTREMITIES.  NO OTHER MUSCLE/JOINT PAINS.  NO MEDICATIONS TAKE FOR SYMPTOMS.  PAIN IS EXACERBATED BY MOVEMENT.  NO OTHER COMPLAINTS TODAY.

## 2019-11-14 ENCOUNTER — APPOINTMENT (OUTPATIENT)
Dept: FAMILY MEDICINE | Facility: CLINIC | Age: 34
End: 2019-11-14
Payer: MEDICAID

## 2019-11-14 VITALS
BODY MASS INDEX: 46.56 KG/M2 | HEIGHT: 62 IN | HEART RATE: 82 BPM | TEMPERATURE: 97.8 F | DIASTOLIC BLOOD PRESSURE: 80 MMHG | WEIGHT: 253 LBS | SYSTOLIC BLOOD PRESSURE: 120 MMHG

## 2019-11-14 LAB
FLUAV SPEC QL CULT: NEGATIVE
FLUBV AG SPEC QL IA: NEGATIVE

## 2019-11-14 PROCEDURE — 99213 OFFICE O/P EST LOW 20 MIN: CPT | Mod: 25

## 2019-11-14 PROCEDURE — 87804 INFLUENZA ASSAY W/OPTIC: CPT | Mod: QW

## 2019-11-14 NOTE — PLAN
[FreeTextEntry1] : RAPID FLU TEST: NEGATIVE\par SUPPORTIVE CARE: REST, FLUIDS\par TYLENOL OR MOTRIN PRN AS DIRECTED\par MUCINEX\par LIKELY VIRAL; TO CALL IF SYMPTOMS PERSIST/WORSEN\par CALL WITH ANY QUESTIONS, CONCERNS OR CHANGES

## 2019-11-14 NOTE — REVIEW OF SYSTEMS
[Chills] : chills [Fatigue] : fatigue [Sore Throat] : sore throat [Cough] : cough [Fever] : no fever [Earache] : no earache [Hearing Loss] : no hearing loss [Nasal Discharge] : no nasal discharge [Chest Pain] : no chest pain [Postnasal Drip] : no postnasal drip [Shortness Of Breath] : no shortness of breath [Palpitations] : no palpitations [Lower Ext Edema] : no lower extremity edema [Wheezing] : no wheezing

## 2019-11-14 NOTE — PHYSICAL EXAM
[No Acute Distress] : no acute distress [Well Nourished] : well nourished [Well-Appearing] : well-appearing [Normal Oropharynx] : the oropharynx was normal [Normal Outer Ear/Nose] : the outer ears and nose were normal in appearance [Normal TMs] : both tympanic membranes were normal [No Lymphadenopathy] : no lymphadenopathy [No Respiratory Distress] : no respiratory distress  [Supple] : supple [No Accessory Muscle Use] : no accessory muscle use [Clear to Auscultation] : lungs were clear to auscultation bilaterally [Regular Rhythm] : with a regular rhythm [Normal Rate] : normal rate  [Normal S1, S2] : normal S1 and S2 [No Murmur] : no murmur heard

## 2019-11-14 NOTE — HISTORY OF PRESENT ILLNESS
[FreeTextEntry8] : MS. ZAMUDIO IS A PLEASANT 33 YO PRESENTING FOR ACUTE VISIT. TODAY WITH COMPLAINT OF COUGH FOR THE PAST FEW DAYS.  NO CONGESTION.  MILD SORE THROAT AT TIMES.  FEELING ACHY.  NO FEVER.  DENIES POSTNASAL DRIP.  INTERMITTENT ASSOCIATED CHILLS.  NO N/V/D OR ABDOMINAL PAIN.  HAD SICK CONTACTS AT HOME - SOCK HAD SIMILAR SYMPTOMS.  IS TAKING TYLENOL.  HAS HAD NO HEADACHES, DIZZINESS, CHEST PAIN, SHORTNESS OF BREATH, GI OR URINARY COMPLAINTS. NO OTHER COMPLAINTS TODAY.

## 2019-12-18 ENCOUNTER — APPOINTMENT (OUTPATIENT)
Dept: FAMILY MEDICINE | Facility: CLINIC | Age: 34
End: 2019-12-18
Payer: MEDICAID

## 2019-12-18 VITALS
SYSTOLIC BLOOD PRESSURE: 120 MMHG | WEIGHT: 252 LBS | BODY MASS INDEX: 46.38 KG/M2 | DIASTOLIC BLOOD PRESSURE: 80 MMHG | HEIGHT: 62 IN | HEART RATE: 68 BPM

## 2019-12-18 PROCEDURE — 99213 OFFICE O/P EST LOW 20 MIN: CPT

## 2019-12-18 NOTE — PHYSICAL EXAM
[No Acute Distress] : no acute distress [Well Nourished] : well nourished [Well-Appearing] : well-appearing [No Accessory Muscle Use] : no accessory muscle use [No Respiratory Distress] : no respiratory distress  [Clear to Auscultation] : lungs were clear to auscultation bilaterally [Normal Rate] : normal rate  [Regular Rhythm] : with a regular rhythm [Normal S1, S2] : normal S1 and S2 [No Murmur] : no murmur heard [Non Tender] : non-tender [Normal Bowel Sounds] : normal bowel sounds [Soft] : abdomen soft

## 2019-12-18 NOTE — COUNSELING
[Encouraged to increase physical activity] : Encouraged to increase physical activity [____ min/wk Activity] : [unfilled] min/wk activity [Decrease Portions] : decrease portions

## 2019-12-21 NOTE — PLAN
[FreeTextEntry1] : COUNSELED ON HEALTHY DIET AND LIFESTYLE MODIFICATIONS\par HEALTHY WEIGHT LOSS DISCUSSED\par NUTRITIONIST EVALUATION\par KEEP FOOD DIARY\par FOLLOW-UP ALL SPECIALISTS AS DIRECTED INCLUDING ENDOCRINOLOGY; GIVEN CONTACT INFORMATION \par FLU VACCINE DECLINED \par CALL WITH ANY QUESTIONS, CONCERNS OR CHANGES.

## 2019-12-21 NOTE — REVIEW OF SYSTEMS
[Fever] : no fever [Chills] : no chills [Fatigue] : no fatigue [Chest Pain] : no chest pain [Palpitations] : no palpitations [Lower Ext Edema] : no lower extremity edema [Shortness Of Breath] : no shortness of breath [Wheezing] : no wheezing [Cough] : no cough [Abdominal Pain] : no abdominal pain [Vomiting] : no vomiting

## 2019-12-21 NOTE — HISTORY OF PRESENT ILLNESS
[Family Member] : family member [FreeTextEntry1] : F/U OBESITY.  [de-identified] : MS. ZAMUDIO IS A PLEASANT 35 YO PRESENTING FOR FOLLOW UP WITH SISTER-IN-LAW TODAY. REPORTS THAT SHE WAS ADOPTED AND WOULD LIKE GENETIC TESTING TO DETERMINE HER FAMILY MEDICAL HISTORY. WILL SEE GENETIC COUNSELOR.  SHE CONTINUES TO COMPLAIN OF TROUBLE LOSING WEIGHT. CHRONIC HISTORY OF OBESITY. SHE IS ACTIVELY TRYING TO MAKE HEALTHIER CHOICES AND DIET IS IMPROVED. IS NOT ROUTINELY EXERCISING. HAS HAD NO CHEST PAIN, PALPITATIONS, SHORTNESS OF BREATH, HEADACHE, DIZZINESS, GI OR URINARY COMPLAINTS. NO OTHER COMPLAINTS TODAY.

## 2020-03-09 ENCOUNTER — TRANSCRIPTION ENCOUNTER (OUTPATIENT)
Age: 35
End: 2020-03-09

## 2020-04-01 NOTE — ED ADULT TRIAGE NOTE - MODE OF ARRIVAL
Clinical Nutrition     Multidisciplinary Rounds      Patient Name: Joseph Rodriguez  Date of Encounter: 04/01/20 09:49  MRN: [id number]  Admission date: 3/31/2020      Reason for visit: MDR. RD to continue to follow per protocol.     Current diet: NPO Diet NPO Except: Ice Chips  No active supplement orders      EMR reviewed   MST=0    Intervention:  Follow treatment plan  Care plan reviewed    Follow up:   Per protocol      Judie Barcenas RDN, KRIS  9:49 AM  Time: 10min         Walk in

## 2020-05-04 ENCOUNTER — APPOINTMENT (OUTPATIENT)
Dept: ENDOCRINOLOGY | Facility: CLINIC | Age: 35
End: 2020-05-04
Payer: MEDICAID

## 2020-05-04 VITALS
HEIGHT: 63 IN | SYSTOLIC BLOOD PRESSURE: 110 MMHG | BODY MASS INDEX: 44.47 KG/M2 | HEART RATE: 82 BPM | WEIGHT: 251 LBS | DIASTOLIC BLOOD PRESSURE: 78 MMHG

## 2020-05-04 DIAGNOSIS — E03.9 HYPOTHYROIDISM, UNSPECIFIED: ICD-10-CM

## 2020-05-04 PROCEDURE — 99204 OFFICE O/P NEW MOD 45 MIN: CPT

## 2020-06-29 ENCOUNTER — APPOINTMENT (OUTPATIENT)
Dept: OBGYN | Facility: CLINIC | Age: 35
End: 2020-06-29
Payer: MEDICAID

## 2020-06-29 VITALS
DIASTOLIC BLOOD PRESSURE: 99 MMHG | HEIGHT: 63 IN | WEIGHT: 255 LBS | HEART RATE: 95 BPM | BODY MASS INDEX: 45.18 KG/M2 | SYSTOLIC BLOOD PRESSURE: 144 MMHG

## 2020-06-29 DIAGNOSIS — R10.2 PELVIC AND PERINEAL PAIN: ICD-10-CM

## 2020-06-29 PROCEDURE — 99214 OFFICE O/P EST MOD 30 MIN: CPT

## 2020-06-29 NOTE — PHYSICAL EXAM
[Awake] : awake [Alert] : alert [Acute Distress] : no acute distress [Nipple Discharge] : no nipple discharge [Mass] : no breast mass [Soft] : soft [Axillary LAD] : no axillary lymphadenopathy [Oriented x3] : oriented to person, place, and time [Tender] : non tender [Normal] : uterus [Tenderness] : tender [Moderate] : there was moderate vaginal bleeding [Enlarged ___ wks] : enlarged [unfilled] ~Uweeks [Uterine Adnexae] : were not tender and not enlarged [Adnexa Tenderness] : were tender on palpation [Adnexa Tenderness On The Right] : was tender to palpation [Adnexa Tenderness On The Left] : was tender to palpation

## 2020-06-29 NOTE — HISTORY OF PRESENT ILLNESS
[Lower-Rt-Q] : lower right quadrant [Suprapubic] : suprapubic area [Lower-Lt-Q] : left lower quadrant [Burning] : no burning [Dull] : no dull You can access the [facility] Patient Portal, offered by [facility], by registering with the following website: [email or web address] [Continuous] : no continuous [Sharp] : sharp [Stabbing] : stabbing [4/10] : is 4/10 in severity [Throbbing] : no throbbing [Fever] : no fever [Vomiting] : no vomiting [Nausea] : no nausea [Diarrhea] : no diarrhea [Vaginal Bleeding] : no vaginal bleeding [Pelvic Pressure] : no pelvic pressure [Dysuria] : no dysuria [Dysmenorrhea] : dysmenorrhea [Heat] : improved by heat [Non-opiod Analgesic] : improved by non-opiod analgesic [Activity] : worsened by activity [[place]] : worsened by intercourse [Emotional Stress] : worsened by emotional stress [Menses] : worsened by menses [Lifting] : worsened by lifting [Early Pregnancy] : not pregnant [Movement] : worsened by movement [New Sexual Partner] : no new sexual partners [Excessive Physical Activity] : no excessive physical activity [Pelvic Trauma] : no pelvic trauma [STDs] : no sexually transmitted disease [Current IUD] : not currently using an IUD [Appendicitis] : no history of appendicitis [Previous IUD] : no history of IUD use [Cholelithiasis] : no history of cholelithiasis [Crohn's Disease] : no history of Crohn's disease [Diverticular Disease] : no history of Diverticular disease [Intrauterine Pregnancy] : no history of intrauterine pregnancy [Nephrolithiasis] : no history of nephrolithiasis [Ovarian Torsion] : no history of ovarian torsion [Ovarian Mass] : no history of ovarian mass

## 2020-06-30 LAB
C TRACH RRNA SPEC QL NAA+PROBE: NOT DETECTED
HPV HIGH+LOW RISK DNA PNL CVX: NOT DETECTED
N GONORRHOEA RRNA SPEC QL NAA+PROBE: NOT DETECTED
SOURCE TP AMPLIFICATION: NORMAL

## 2020-07-06 LAB — CYTOLOGY CVX/VAG DOC THIN PREP: NORMAL

## 2020-07-10 ENCOUNTER — APPOINTMENT (OUTPATIENT)
Dept: ANTEPARTUM | Facility: CLINIC | Age: 35
End: 2020-07-10
Payer: MEDICAID

## 2020-07-10 ENCOUNTER — ASOB RESULT (OUTPATIENT)
Age: 35
End: 2020-07-10

## 2020-07-10 PROCEDURE — 76856 US EXAM PELVIC COMPLETE: CPT | Mod: 59

## 2020-07-10 PROCEDURE — 76830 TRANSVAGINAL US NON-OB: CPT

## 2020-07-29 ENCOUNTER — APPOINTMENT (OUTPATIENT)
Dept: OBGYN | Facility: CLINIC | Age: 35
End: 2020-07-29
Payer: MEDICAID

## 2020-07-29 VITALS — BODY MASS INDEX: 45.17 KG/M2 | DIASTOLIC BLOOD PRESSURE: 89 MMHG | SYSTOLIC BLOOD PRESSURE: 127 MMHG | WEIGHT: 255 LBS

## 2020-07-29 DIAGNOSIS — N92.1 EXCESSIVE AND FREQUENT MENSTRUATION WITH IRREGULAR CYCLE: ICD-10-CM

## 2020-07-29 DIAGNOSIS — N93.0 POSTCOITAL AND CONTACT BLEEDING: ICD-10-CM

## 2020-07-29 PROCEDURE — 58558Z: CUSTOM

## 2020-07-29 NOTE — PROCEDURE
[Endometrial Biopsy] : Endometrial biopsy [Irregular Bleeding] : irregular uterine bleeding [Risks] : risks [Benefits] : benefits [Infection] : infection [Patient] : patient [Alternatives] : alternatives [Allergic Reaction] : allergic reaction [Bleeding] : bleeding [CONSENT OBTAINED] : written consent was obtained prior to the procedure. [Uterine Perforation] : uterine perforation [Pain] : pain [Neg Pregnancy Test] : a pregnancy test was negative [LMP ___] : LMP was [unfilled] [Betadine] : Betadine [1%] : 1% [Paracervical Block] : A paracervical block was performed using [Required Dilation] : required dilation [Without Epi] : without epinephrine [Tenaculum] : a single toothed tenaculum [Sounded to ___ cm] : sounded to [unfilled] ~Ucm [Anteverted] : anteverted [Pipelle] : a Pipelle endometrial suction curette [Moderate] : a moderate [Tolerated Well] : the patient tolerated the procedure well [No Complications] : there were no complications [Sent to Histology] : the specimen was place in buffered formalin and sent for pathlogy

## 2020-08-03 LAB — CORE LAB BIOPSY: NORMAL

## 2020-08-26 ENCOUNTER — APPOINTMENT (OUTPATIENT)
Dept: OBGYN | Facility: CLINIC | Age: 35
End: 2020-08-26
Payer: MEDICAID

## 2020-08-26 VITALS
BODY MASS INDEX: 45.18 KG/M2 | SYSTOLIC BLOOD PRESSURE: 134 MMHG | WEIGHT: 255 LBS | HEART RATE: 82 BPM | HEIGHT: 63 IN | DIASTOLIC BLOOD PRESSURE: 79 MMHG

## 2020-08-26 DIAGNOSIS — N97.9 FEMALE INFERTILITY, UNSPECIFIED: ICD-10-CM

## 2020-08-26 PROCEDURE — 99213 OFFICE O/P EST LOW 20 MIN: CPT

## 2020-08-26 NOTE — COUNSELING
[Breast Self Exam] : breast self exam MD Office [Nutrition] : nutrition [Exercise] : exercise [STD (testing, results, tx)] : STD (testing, results, tx) [Vitamins/Supplements] : vitamins/supplements [Safe Sexual Practices] : safe sexual practices

## 2020-11-23 ENCOUNTER — APPOINTMENT (OUTPATIENT)
Dept: OBGYN | Facility: CLINIC | Age: 35
End: 2020-11-23

## 2020-12-10 ENCOUNTER — EMERGENCY (EMERGENCY)
Facility: HOSPITAL | Age: 35
LOS: 1 days | Discharge: HOME | End: 2020-12-10
Attending: STUDENT IN AN ORGANIZED HEALTH CARE EDUCATION/TRAINING PROGRAM
Payer: MEDICAID

## 2020-12-10 VITALS
HEART RATE: 81 BPM | DIASTOLIC BLOOD PRESSURE: 97 MMHG | OXYGEN SATURATION: 99 % | HEIGHT: 65 IN | RESPIRATION RATE: 19 BRPM | TEMPERATURE: 98 F | SYSTOLIC BLOOD PRESSURE: 142 MMHG | WEIGHT: 240.08 LBS

## 2020-12-10 VITALS
DIASTOLIC BLOOD PRESSURE: 72 MMHG | OXYGEN SATURATION: 97 % | TEMPERATURE: 99 F | SYSTOLIC BLOOD PRESSURE: 105 MMHG | RESPIRATION RATE: 20 BRPM | HEART RATE: 65 BPM

## 2020-12-10 LAB
ALBUMIN SERPL ELPH-MCNC: 4.3 G/DL — SIGNIFICANT CHANGE UP (ref 3.3–5.2)
ALP SERPL-CCNC: 107 U/L — SIGNIFICANT CHANGE UP (ref 40–120)
ALT FLD-CCNC: 31 U/L — SIGNIFICANT CHANGE UP
ANION GAP SERPL CALC-SCNC: 13 MMOL/L — SIGNIFICANT CHANGE UP (ref 5–17)
APPEARANCE UR: CLEAR — SIGNIFICANT CHANGE UP
AST SERPL-CCNC: 30 U/L — SIGNIFICANT CHANGE UP
BASOPHILS # BLD AUTO: 0.03 K/UL — SIGNIFICANT CHANGE UP (ref 0–0.2)
BASOPHILS NFR BLD AUTO: 0.3 % — SIGNIFICANT CHANGE UP (ref 0–2)
BILIRUB SERPL-MCNC: 0.3 MG/DL — LOW (ref 0.4–2)
BILIRUB UR-MCNC: NEGATIVE — SIGNIFICANT CHANGE UP
BUN SERPL-MCNC: 8 MG/DL — SIGNIFICANT CHANGE UP (ref 8–20)
CALCIUM SERPL-MCNC: 9 MG/DL — SIGNIFICANT CHANGE UP (ref 8.6–10.2)
CHLORIDE SERPL-SCNC: 102 MMOL/L — SIGNIFICANT CHANGE UP (ref 98–107)
CO2 SERPL-SCNC: 18 MMOL/L — LOW (ref 22–29)
COLOR SPEC: YELLOW — SIGNIFICANT CHANGE UP
CREAT SERPL-MCNC: 0.47 MG/DL — LOW (ref 0.5–1.3)
DIFF PNL FLD: ABNORMAL
EOSINOPHIL # BLD AUTO: 0.08 K/UL — SIGNIFICANT CHANGE UP (ref 0–0.5)
EOSINOPHIL NFR BLD AUTO: 0.9 % — SIGNIFICANT CHANGE UP (ref 0–6)
EPI CELLS # UR: SIGNIFICANT CHANGE UP
GLUCOSE SERPL-MCNC: 171 MG/DL — HIGH (ref 70–99)
GLUCOSE UR QL: 50 MG/DL
HCG SERPL-ACNC: 165.6 MIU/ML — HIGH
HCT VFR BLD CALC: 40 % — SIGNIFICANT CHANGE UP (ref 34.5–45)
HGB BLD-MCNC: 13.2 G/DL — SIGNIFICANT CHANGE UP (ref 11.5–15.5)
IMM GRANULOCYTES NFR BLD AUTO: 0.3 % — SIGNIFICANT CHANGE UP (ref 0–1.5)
KETONES UR-MCNC: NEGATIVE — SIGNIFICANT CHANGE UP
LACTATE BLDV-MCNC: 2.4 MMOL/L — HIGH (ref 0.5–2)
LEUKOCYTE ESTERASE UR-ACNC: ABNORMAL
LIDOCAIN IGE QN: 35 U/L — SIGNIFICANT CHANGE UP (ref 22–51)
LYMPHOCYTES # BLD AUTO: 1.49 K/UL — SIGNIFICANT CHANGE UP (ref 1–3.3)
LYMPHOCYTES # BLD AUTO: 16.9 % — SIGNIFICANT CHANGE UP (ref 13–44)
MCHC RBC-ENTMCNC: 27.6 PG — SIGNIFICANT CHANGE UP (ref 27–34)
MCHC RBC-ENTMCNC: 33 GM/DL — SIGNIFICANT CHANGE UP (ref 32–36)
MCV RBC AUTO: 83.5 FL — SIGNIFICANT CHANGE UP (ref 80–100)
MONOCYTES # BLD AUTO: 0.3 K/UL — SIGNIFICANT CHANGE UP (ref 0–0.9)
MONOCYTES NFR BLD AUTO: 3.4 % — SIGNIFICANT CHANGE UP (ref 2–14)
NEUTROPHILS # BLD AUTO: 6.9 K/UL — SIGNIFICANT CHANGE UP (ref 1.8–7.4)
NEUTROPHILS NFR BLD AUTO: 78.2 % — HIGH (ref 43–77)
NITRITE UR-MCNC: NEGATIVE — SIGNIFICANT CHANGE UP
PH UR: 6.5 — SIGNIFICANT CHANGE UP (ref 5–8)
PLATELET # BLD AUTO: 383 K/UL — SIGNIFICANT CHANGE UP (ref 150–400)
POTASSIUM SERPL-MCNC: 3.9 MMOL/L — SIGNIFICANT CHANGE UP (ref 3.5–5.3)
POTASSIUM SERPL-SCNC: 3.9 MMOL/L — SIGNIFICANT CHANGE UP (ref 3.5–5.3)
PROT SERPL-MCNC: 8.4 G/DL — SIGNIFICANT CHANGE UP (ref 6.6–8.7)
PROT UR-MCNC: 15 MG/DL
RBC # BLD: 4.79 M/UL — SIGNIFICANT CHANGE UP (ref 3.8–5.2)
RBC # FLD: 13.5 % — SIGNIFICANT CHANGE UP (ref 10.3–14.5)
RBC CASTS # UR COMP ASSIST: ABNORMAL /HPF (ref 0–4)
SODIUM SERPL-SCNC: 133 MMOL/L — LOW (ref 135–145)
SP GR SPEC: 1.01 — SIGNIFICANT CHANGE UP (ref 1.01–1.02)
UROBILINOGEN FLD QL: NEGATIVE MG/DL — SIGNIFICANT CHANGE UP
WBC # BLD: 8.83 K/UL — SIGNIFICANT CHANGE UP (ref 3.8–10.5)
WBC # FLD AUTO: 8.83 K/UL — SIGNIFICANT CHANGE UP (ref 3.8–10.5)
WBC UR QL: SIGNIFICANT CHANGE UP

## 2020-12-10 PROCEDURE — 74181 MRI ABDOMEN W/O CONTRAST: CPT | Mod: 26

## 2020-12-10 PROCEDURE — 76817 TRANSVAGINAL US OBSTETRIC: CPT | Mod: 26

## 2020-12-10 PROCEDURE — 96375 TX/PRO/DX INJ NEW DRUG ADDON: CPT

## 2020-12-10 PROCEDURE — 74181 MRI ABDOMEN W/O CONTRAST: CPT

## 2020-12-10 PROCEDURE — 96374 THER/PROPH/DIAG INJ IV PUSH: CPT

## 2020-12-10 PROCEDURE — 76817 TRANSVAGINAL US OBSTETRIC: CPT

## 2020-12-10 PROCEDURE — 83690 ASSAY OF LIPASE: CPT

## 2020-12-10 PROCEDURE — 81001 URINALYSIS AUTO W/SCOPE: CPT

## 2020-12-10 PROCEDURE — 85025 COMPLETE CBC W/AUTO DIFF WBC: CPT

## 2020-12-10 PROCEDURE — 99285 EMERGENCY DEPT VISIT HI MDM: CPT

## 2020-12-10 PROCEDURE — 76801 OB US < 14 WKS SINGLE FETUS: CPT | Mod: 26

## 2020-12-10 PROCEDURE — 99284 EMERGENCY DEPT VISIT MOD MDM: CPT | Mod: 25

## 2020-12-10 PROCEDURE — 83605 ASSAY OF LACTIC ACID: CPT

## 2020-12-10 PROCEDURE — 84702 CHORIONIC GONADOTROPIN TEST: CPT

## 2020-12-10 PROCEDURE — 36415 COLL VENOUS BLD VENIPUNCTURE: CPT

## 2020-12-10 PROCEDURE — 80053 COMPREHEN METABOLIC PANEL: CPT

## 2020-12-10 PROCEDURE — 87086 URINE CULTURE/COLONY COUNT: CPT

## 2020-12-10 PROCEDURE — 76801 OB US < 14 WKS SINGLE FETUS: CPT

## 2020-12-10 RX ORDER — ONDANSETRON 8 MG/1
4 TABLET, FILM COATED ORAL ONCE
Refills: 0 | Status: COMPLETED | OUTPATIENT
Start: 2020-12-10 | End: 2020-12-10

## 2020-12-10 RX ORDER — ACETAMINOPHEN 500 MG
650 TABLET ORAL ONCE
Refills: 0 | Status: COMPLETED | OUTPATIENT
Start: 2020-12-10 | End: 2020-12-10

## 2020-12-10 RX ORDER — MORPHINE SULFATE 50 MG/1
4 CAPSULE, EXTENDED RELEASE ORAL ONCE
Refills: 0 | Status: DISCONTINUED | OUTPATIENT
Start: 2020-12-10 | End: 2020-12-10

## 2020-12-10 RX ORDER — SODIUM CHLORIDE 9 MG/ML
500 INJECTION INTRAMUSCULAR; INTRAVENOUS; SUBCUTANEOUS ONCE
Refills: 0 | Status: COMPLETED | OUTPATIENT
Start: 2020-12-10 | End: 2020-12-10

## 2020-12-10 RX ADMIN — SODIUM CHLORIDE 500 MILLILITER(S): 9 INJECTION INTRAMUSCULAR; INTRAVENOUS; SUBCUTANEOUS at 02:00

## 2020-12-10 RX ADMIN — Medication 650 MILLIGRAM(S): at 07:03

## 2020-12-10 RX ADMIN — ONDANSETRON 4 MILLIGRAM(S): 8 TABLET, FILM COATED ORAL at 02:00

## 2020-12-10 RX ADMIN — MORPHINE SULFATE 4 MILLIGRAM(S): 50 CAPSULE, EXTENDED RELEASE ORAL at 02:00

## 2020-12-10 NOTE — ED ADULT TRIAGE NOTE - CHIEF COMPLAINT QUOTE
Patient presented to ed secondary to lower abdominal pain with nausea vomiting, diarrhea and chills started today.

## 2020-12-10 NOTE — ED PROVIDER NOTE - PATIENT PORTAL LINK FT
You can access the FollowMyHealth Patient Portal offered by [facility] by registering at the following website: [email or web address]. By joining [employer]’s FollowMyHealth portal, you will also be able to view your health information using other applications (apps) compatible with our system.

## 2020-12-10 NOTE — ED ADULT NURSE NOTE - DOES PATIENT HAVE ADVANCE DIRECTIVE
Histology Text: Following the procedure a portion of the curetted material was sent for histologic evaluation. Bill For Surgical Tray: no Number Of Curettages: 3 Detail Level: Detailed Cautery Type: electrodesiccation Anesthesia Type: 1% lidocaine with epinephrine Biopsy Type: H and E Billing Type: Third-Party Bill Size Of Lesion After Curettage: 1.3 Bill As A Line Item Or As Units: Line Item Consent was obtained from the patient. The risks, benefits and alternatives to therapy were discussed in detail. Specifically, the risks of infection, scarring, bleeding, prolonged wound healing, nerve injury, incomplete removal, allergy to anesthesia and recurrence were addressed. Alternatives to ED&C, such as: surgical removal and XRT were also discussed.  Prior to the procedure, the treatment site was clearly identified and confirmed by the patient. All components of Universal Protocol/PAUSE Rule completed. Additional Information: (Optional): The wound was cleaned, and a pressure dressing was applied.  The patient received detailed post-op instructions. Post-Care Instructions: I reviewed with the patient in detail post-care instructions. Patient is to keep the area dry for 48 hours, and not to engage in any swimming until the area is healed. Should the patient develop any fevers, chills, bleeding, severe pain patient will contact the office immediately. Lab: 428 Anesthesia Volume In Cc: 0.5 What Was Performed First?: Curettage Size Of Lesion In Cm: 1.1 No

## 2020-12-10 NOTE — ED ADULT NURSE NOTE - OBJECTIVE STATEMENT
Patient is alert and oriented x3 c/o lower abd pain radiating to RLQ xfew days. progressively worsening per patient. states she has infrequent periods. denies vaginally bleeding, denies hematuria or dysuria. denies chest pain or shortness of breath. also c/o n/v/d. color normal for ethnicity,

## 2020-12-10 NOTE — ED PROVIDER NOTE - CLINICAL SUMMARY MEDICAL DECISION MAKING FREE TEXT BOX
36 y/o female with no sign medical history presents to the ED c/o lower abdominal pain, nausea, vomiting and diarrhea. labs, meds, ct and reassess

## 2020-12-10 NOTE — ED PROVIDER NOTE - OBJECTIVE STATEMENT
34 y/o female with no sign medical history presents to the ED c/o lower abdominal pain, nausea, vomiting and diarrhea. pt notes she has irregular periods, began to feel cramps in her abdomen like she was about to have a menstrual cycle but notes pain gradually worsened once she began to have her menstrual cycle. Notes pain localized tot he right lower quadrant. Notes pain in the right lower quadrant started around 8/10 in intensity attempted to take motrin around 9 pm but has consistently worsened. Deneis chest pain, sob, fevers, urinary symptoms.

## 2020-12-10 NOTE — ED PROVIDER NOTE - AGGRAVATING FACTORS
Airway  Urgency: elective    Date/Time: 5/29/2019 5:11 PM  Airway not difficult    General Information and Staff    Patient location during procedure: OR  Anesthesiologist: Huang Molina MD  CRNA: Danya Benavides CRNA    Indications and Patient Condition  Indications for airway management: airway protection    Preoxygenated: yes  MILS not maintained throughout  Mask difficulty assessment: 0 - not attempted    Final Airway Details  Final airway type: supraglottic airway      Successful airway: unique  Size 4    Number of attempts at approach: 1    Additional Comments  LMA inserted with ease, assist to SV             none

## 2020-12-10 NOTE — ED PROVIDER NOTE - PROGRESS NOTE DETAILS
POLO- HCg positive, 165, will d/c ct at this time, obtain u/s, pt reports feeling better Yakov HICKMAN: labs and diagnostic imaging results reviewed with patient; no acute pathologic event identified; PMD or clinic follow up recommended for reassessment. Patient is aware of signs/symptoms to return to the emergency department.

## 2020-12-10 NOTE — ED PROVIDER NOTE - ATTENDING CONTRIBUTION TO CARE
36yo female with no pmh presents with generalized abd cramping since starting her menses, took motrin but worsened today and localizing to rlq. +nausea/vomiting/diarrhea/aneroxia  +rlq ttp  pt found to be in early pregnancy, concern for appy therefore MRI

## 2020-12-10 NOTE — ED ADULT NURSE NOTE - NS ED NURSE RECORD ANOTHER VITAL SIGN
Bryan said that he has previously came into the office and spoke to you about blood in his stool. He called today and said that problem is getting much worse and wants to be referred out to a specialist.    Ok i made referral to see general surgeon Dr Johnie Waldrop 1 extra glass of water a day  2 prunes a day to keep stools soft.  Use baby wipes on anus.    Jose Fonseca MD     Yes

## 2020-12-11 LAB
CULTURE RESULTS: SIGNIFICANT CHANGE UP
SPECIMEN SOURCE: SIGNIFICANT CHANGE UP

## 2020-12-15 ENCOUNTER — LABORATORY RESULT (OUTPATIENT)
Age: 35
End: 2020-12-15

## 2020-12-15 ENCOUNTER — APPOINTMENT (OUTPATIENT)
Dept: OBGYN | Facility: CLINIC | Age: 35
End: 2020-12-15
Payer: MEDICAID

## 2020-12-15 VITALS — SYSTOLIC BLOOD PRESSURE: 132 MMHG | DIASTOLIC BLOOD PRESSURE: 89 MMHG | BODY MASS INDEX: 44.91 KG/M2 | WEIGHT: 253.5 LBS

## 2020-12-15 DIAGNOSIS — N91.1 SECONDARY AMENORRHEA: ICD-10-CM

## 2020-12-15 LAB
HCG UR QL: POSITIVE
QUALITY CONTROL: YES

## 2020-12-15 PROCEDURE — 81025 URINE PREGNANCY TEST: CPT

## 2020-12-15 PROCEDURE — 36415 COLL VENOUS BLD VENIPUNCTURE: CPT

## 2020-12-15 PROCEDURE — 99214 OFFICE O/P EST MOD 30 MIN: CPT

## 2020-12-15 PROCEDURE — 99072 ADDL SUPL MATRL&STAF TM PHE: CPT

## 2020-12-17 LAB
HCG SERPL QL: POSITIVE
PAPP-A SERPL-ACNC: 128 MIU/ML

## 2020-12-19 ENCOUNTER — EMERGENCY (EMERGENCY)
Facility: HOSPITAL | Age: 35
LOS: 1 days | Discharge: HOME | End: 2020-12-19
Attending: EMERGENCY MEDICINE
Payer: MEDICAID

## 2020-12-19 VITALS
HEART RATE: 90 BPM | DIASTOLIC BLOOD PRESSURE: 92 MMHG | HEIGHT: 65 IN | RESPIRATION RATE: 18 BRPM | OXYGEN SATURATION: 98 % | WEIGHT: 240.08 LBS | SYSTOLIC BLOOD PRESSURE: 139 MMHG | TEMPERATURE: 99 F

## 2020-12-19 LAB
ALBUMIN SERPL ELPH-MCNC: 4.1 G/DL — SIGNIFICANT CHANGE UP (ref 3.3–5.2)
ALP SERPL-CCNC: 104 U/L — SIGNIFICANT CHANGE UP (ref 40–120)
ALT FLD-CCNC: 32 U/L — SIGNIFICANT CHANGE UP
ANION GAP SERPL CALC-SCNC: 11 MMOL/L — SIGNIFICANT CHANGE UP (ref 5–17)
APPEARANCE UR: ABNORMAL
AST SERPL-CCNC: 32 U/L — HIGH
BACTERIA # UR AUTO: ABNORMAL
BASOPHILS # BLD AUTO: 0.02 K/UL — SIGNIFICANT CHANGE UP (ref 0–0.2)
BASOPHILS NFR BLD AUTO: 0.2 % — SIGNIFICANT CHANGE UP (ref 0–2)
BILIRUB SERPL-MCNC: 0.2 MG/DL — LOW (ref 0.4–2)
BILIRUB UR-MCNC: NEGATIVE — SIGNIFICANT CHANGE UP
BUN SERPL-MCNC: 11 MG/DL — SIGNIFICANT CHANGE UP (ref 8–20)
CALCIUM SERPL-MCNC: 9.3 MG/DL — SIGNIFICANT CHANGE UP (ref 8.6–10.2)
CHLORIDE SERPL-SCNC: 104 MMOL/L — SIGNIFICANT CHANGE UP (ref 98–107)
CO2 SERPL-SCNC: 22 MMOL/L — SIGNIFICANT CHANGE UP (ref 22–29)
COLOR SPEC: ABNORMAL
CREAT SERPL-MCNC: 0.48 MG/DL — LOW (ref 0.5–1.3)
DIFF PNL FLD: ABNORMAL
EOSINOPHIL # BLD AUTO: 0.28 K/UL — SIGNIFICANT CHANGE UP (ref 0–0.5)
EOSINOPHIL NFR BLD AUTO: 3.1 % — SIGNIFICANT CHANGE UP (ref 0–6)
EPI CELLS # UR: SIGNIFICANT CHANGE UP
GLUCOSE SERPL-MCNC: 105 MG/DL — HIGH (ref 70–99)
GLUCOSE UR QL: NEGATIVE MG/DL — SIGNIFICANT CHANGE UP
HCG SERPL-ACNC: 91.5 MIU/ML — HIGH
HCT VFR BLD CALC: 39.9 % — SIGNIFICANT CHANGE UP (ref 34.5–45)
HGB BLD-MCNC: 13.2 G/DL — SIGNIFICANT CHANGE UP (ref 11.5–15.5)
IMM GRANULOCYTES NFR BLD AUTO: 0.3 % — SIGNIFICANT CHANGE UP (ref 0–1.5)
KETONES UR-MCNC: NEGATIVE — SIGNIFICANT CHANGE UP
LEUKOCYTE ESTERASE UR-ACNC: ABNORMAL
LYMPHOCYTES # BLD AUTO: 2.31 K/UL — SIGNIFICANT CHANGE UP (ref 1–3.3)
LYMPHOCYTES # BLD AUTO: 25.4 % — SIGNIFICANT CHANGE UP (ref 13–44)
MCHC RBC-ENTMCNC: 28.4 PG — SIGNIFICANT CHANGE UP (ref 27–34)
MCHC RBC-ENTMCNC: 33.1 GM/DL — SIGNIFICANT CHANGE UP (ref 32–36)
MCV RBC AUTO: 85.8 FL — SIGNIFICANT CHANGE UP (ref 80–100)
MONOCYTES # BLD AUTO: 0.54 K/UL — SIGNIFICANT CHANGE UP (ref 0–0.9)
MONOCYTES NFR BLD AUTO: 5.9 % — SIGNIFICANT CHANGE UP (ref 2–14)
NEUTROPHILS # BLD AUTO: 5.9 K/UL — SIGNIFICANT CHANGE UP (ref 1.8–7.4)
NEUTROPHILS NFR BLD AUTO: 65.1 % — SIGNIFICANT CHANGE UP (ref 43–77)
NITRITE UR-MCNC: NEGATIVE — SIGNIFICANT CHANGE UP
PH UR: 6 — SIGNIFICANT CHANGE UP (ref 5–8)
PLATELET # BLD AUTO: 330 K/UL — SIGNIFICANT CHANGE UP (ref 150–400)
POTASSIUM SERPL-MCNC: 4.4 MMOL/L — SIGNIFICANT CHANGE UP (ref 3.5–5.3)
POTASSIUM SERPL-SCNC: 4.4 MMOL/L — SIGNIFICANT CHANGE UP (ref 3.5–5.3)
PROT SERPL-MCNC: 8 G/DL — SIGNIFICANT CHANGE UP (ref 6.6–8.7)
PROT UR-MCNC: 30 MG/DL
RBC # BLD: 4.65 M/UL — SIGNIFICANT CHANGE UP (ref 3.8–5.2)
RBC # FLD: 13.6 % — SIGNIFICANT CHANGE UP (ref 10.3–14.5)
RBC CASTS # UR COMP ASSIST: >50 /HPF (ref 0–4)
SODIUM SERPL-SCNC: 137 MMOL/L — SIGNIFICANT CHANGE UP (ref 135–145)
SP GR SPEC: 1.01 — SIGNIFICANT CHANGE UP (ref 1.01–1.02)
UROBILINOGEN FLD QL: NEGATIVE MG/DL — SIGNIFICANT CHANGE UP
WBC # BLD: 9.08 K/UL — SIGNIFICANT CHANGE UP (ref 3.8–10.5)
WBC # FLD AUTO: 9.08 K/UL — SIGNIFICANT CHANGE UP (ref 3.8–10.5)
WBC UR QL: SIGNIFICANT CHANGE UP

## 2020-12-19 PROCEDURE — 36415 COLL VENOUS BLD VENIPUNCTURE: CPT

## 2020-12-19 PROCEDURE — 76830 TRANSVAGINAL US NON-OB: CPT | Mod: 26

## 2020-12-19 PROCEDURE — 99285 EMERGENCY DEPT VISIT HI MDM: CPT

## 2020-12-19 PROCEDURE — 84702 CHORIONIC GONADOTROPIN TEST: CPT

## 2020-12-19 PROCEDURE — 76830 TRANSVAGINAL US NON-OB: CPT

## 2020-12-19 PROCEDURE — 81001 URINALYSIS AUTO W/SCOPE: CPT

## 2020-12-19 PROCEDURE — 76856 US EXAM PELVIC COMPLETE: CPT | Mod: 26

## 2020-12-19 PROCEDURE — 87086 URINE CULTURE/COLONY COUNT: CPT

## 2020-12-19 PROCEDURE — 76801 OB US < 14 WKS SINGLE FETUS: CPT

## 2020-12-19 PROCEDURE — 85025 COMPLETE CBC W/AUTO DIFF WBC: CPT

## 2020-12-19 PROCEDURE — 80053 COMPREHEN METABOLIC PANEL: CPT

## 2020-12-19 PROCEDURE — 99284 EMERGENCY DEPT VISIT MOD MDM: CPT

## 2020-12-19 RX ORDER — ACETAMINOPHEN 500 MG
650 TABLET ORAL ONCE
Refills: 0 | Status: COMPLETED | OUTPATIENT
Start: 2020-12-19 | End: 2020-12-19

## 2020-12-19 RX ADMIN — Medication 650 MILLIGRAM(S): at 20:44

## 2020-12-19 NOTE — ED STATDOCS - CARE PROVIDER_API CALL
Sulaiman Man)  Obstetrics and Gynecology  [address], [address]  [place] [address]  Phone: [phone]  Fax: [phone]  Follow Up Time:

## 2020-12-19 NOTE — ED STATDOCS - ATTENDING CONTRIBUTION TO CARE
Domo: I performed a face to face bedside interview with patient regarding history of present illness, review of symptoms and past medical history. I completed an independent physical exam and ordered tests/medications as needed.  I have discussed patient's plan of care with advanced care provider. The advanced care provider assisted in  executing the discussed plan. I was available for any questions or issues that may have arose during the execution of the plan of care.

## 2020-12-19 NOTE — ED STATDOCS - PROGRESS NOTE DETAILS
labs and imaging reviewed. Findings discussed with pt. of no IUP and descending HCG. Pt has follow up with her GYN on the 29th. Pt advised to keep appointment and strict ED return precautions discussed with pt and pt verbalized understanding

## 2020-12-19 NOTE — ED ADULT TRIAGE NOTE - CHIEF COMPLAINT QUOTE
"im pregnant , having abdominal cramps and there was a blood clot when I went to the bathroom ",  , pt unsure how far along she is.

## 2020-12-19 NOTE — ED STATDOCS - NS ED ROS FT
ROS: No fever/chills. No eye pain/changes in vision, No ear pain/sore throat/dysphagia, No chest pain/palpitations. No SOB/cough/.+ abdominal pain, no N/V/D, no black/bloody bm. No dysuria/frequency/discharge, No headache. No Dizziness.    No rashes or breaks in skin. No numbness/tingling/weakness. +vaginal bleeding

## 2020-12-19 NOTE — ED STATDOCS - CLINICAL SUMMARY MEDICAL DECISION MAKING FREE TEXT BOX
Pt with recent positive pregnancy, lower abdominal cramping and vaginal bleeding, pregnancy with undetermined status 9 days ago. Will recheck HCG, urine, US and Tylenol per pt request.

## 2020-12-19 NOTE — ED STATDOCS - NSFOLLOWUPINSTRUCTIONS_ED_ALL_ED_FT
Please keep your follow up appointment with your OBGYN on December 29th, 2020. Bring a copy of today's results.   A urine culture was sent, in 2 days results with be back, if + for infection we will call you and treat you with antibiotics.    Do not have sexual intercourse, tampons or douching. Pelvic rest until cleared by your OBGYN.     SEEK IMMEDIATE MEDICAL CARE IF YOU HAVE ANY OF THE FOLLOWING SYMPTOMS: heavy vaginal bleeding, severe low back or abdominal cramps, fever/chills, or lightheadedness/dizziness.

## 2020-12-19 NOTE — ED STATDOCS - OBJECTIVE STATEMENT
34y/o F  at unknown pregnancy duration with no PMHx presents to the ED c/o worsening abdominal cramping with passage of 1 vaginal clot. Pt was here 9 days ago, found to have  with undetermined US/MRI. She follows with OBGYN Onyebeke and was told to f/u in 1-2 weeks. No urinary sx, fever, orthostatic sx or other complaints.

## 2020-12-19 NOTE — ED STATDOCS - PATIENT PORTAL LINK FT
You can access the FollowMyHealth Patient Portal offered by [facility] by registering at the following website: [email or web address]. By joining [employer]’s FollowMyHealth portal, you will also be able to view your health information using other applications (apps) compatible with our system.

## 2020-12-19 NOTE — ED STATDOCS - PHYSICAL EXAMINATION
Gen: No acute distress, non toxic  HEENT: Mucous membranes moist, pink conjunctivae, EOMI  CV: RRR, nl s1/s2.  Resp: CTAB, normal rate and effort  GI: Abdomen soft, Minimal suprapubic and left lower/right lower tenderness. No rebound, no guarding  : No CVAT  Neuro: A&O x 3, moving all 4 extremities  MSK: No spine or joint tenderness to palpation  Skin: No rashes. intact and perfused.

## 2020-12-20 LAB
CULTURE RESULTS: SIGNIFICANT CHANGE UP
SPECIMEN SOURCE: SIGNIFICANT CHANGE UP

## 2020-12-29 ENCOUNTER — APPOINTMENT (OUTPATIENT)
Dept: OBGYN | Facility: CLINIC | Age: 35
End: 2020-12-29
Payer: MEDICAID

## 2020-12-29 ENCOUNTER — LABORATORY RESULT (OUTPATIENT)
Age: 35
End: 2020-12-29

## 2020-12-29 VITALS — SYSTOLIC BLOOD PRESSURE: 126 MMHG | BODY MASS INDEX: 44.02 KG/M2 | WEIGHT: 248.5 LBS | DIASTOLIC BLOOD PRESSURE: 80 MMHG

## 2020-12-29 PROCEDURE — 99072 ADDL SUPL MATRL&STAF TM PHE: CPT

## 2020-12-29 PROCEDURE — 36415 COLL VENOUS BLD VENIPUNCTURE: CPT

## 2020-12-29 PROCEDURE — 99214 OFFICE O/P EST MOD 30 MIN: CPT

## 2020-12-30 LAB
ABO + RH PNL BLD: NORMAL
BLD GP AB SCN SERPL QL: NORMAL
HCG SERPL-MCNC: 36 MIU/ML

## 2021-01-20 ENCOUNTER — APPOINTMENT (OUTPATIENT)
Dept: OBGYN | Facility: CLINIC | Age: 36
End: 2021-01-20
Payer: MEDICAID

## 2021-01-20 VITALS
DIASTOLIC BLOOD PRESSURE: 85 MMHG | BODY MASS INDEX: 44.54 KG/M2 | WEIGHT: 251.44 LBS | SYSTOLIC BLOOD PRESSURE: 129 MMHG

## 2021-01-20 PROCEDURE — 99072 ADDL SUPL MATRL&STAF TM PHE: CPT

## 2021-01-20 PROCEDURE — 99213 OFFICE O/P EST LOW 20 MIN: CPT

## 2021-01-21 LAB
BILIRUB UR QL STRIP: NORMAL
GLUCOSE UR-MCNC: NORMAL
HCG SERPL QL: NEGATIVE
HCG UR QL: 0.2 EU/DL
HGB UR QL STRIP.AUTO: NORMAL
KETONES UR-MCNC: NORMAL
LEUKOCYTE ESTERASE UR QL STRIP: NORMAL
NITRITE UR QL STRIP: NORMAL
PAPP-A SERPL-ACNC: 3 MIU/ML
PH UR STRIP: 6
PROT UR STRIP-MCNC: NORMAL
SP GR UR STRIP: 1.02

## 2021-01-22 ENCOUNTER — TRANSCRIPTION ENCOUNTER (OUTPATIENT)
Age: 36
End: 2021-01-22

## 2021-04-12 ENCOUNTER — APPOINTMENT (OUTPATIENT)
Dept: OBGYN | Facility: CLINIC | Age: 36
End: 2021-04-12

## 2021-04-28 ENCOUNTER — APPOINTMENT (OUTPATIENT)
Dept: DERMATOLOGY | Facility: CLINIC | Age: 36
End: 2021-04-28
Payer: MEDICAID

## 2021-04-28 ENCOUNTER — APPOINTMENT (OUTPATIENT)
Dept: FAMILY MEDICINE | Facility: CLINIC | Age: 36
End: 2021-04-28
Payer: MEDICAID

## 2021-04-28 VITALS
BODY MASS INDEX: 43.19 KG/M2 | HEART RATE: 80 BPM | SYSTOLIC BLOOD PRESSURE: 120 MMHG | WEIGHT: 253 LBS | HEIGHT: 64 IN | DIASTOLIC BLOOD PRESSURE: 90 MMHG | TEMPERATURE: 97.9 F

## 2021-04-28 DIAGNOSIS — G56.01 CARPAL TUNNEL SYNDROME, RIGHT UPPER LIMB: ICD-10-CM

## 2021-04-28 DIAGNOSIS — Z23 ENCOUNTER FOR IMMUNIZATION: ICD-10-CM

## 2021-04-28 PROCEDURE — 99072 ADDL SUPL MATRL&STAF TM PHE: CPT

## 2021-04-28 PROCEDURE — 99214 OFFICE O/P EST MOD 30 MIN: CPT

## 2021-04-28 PROCEDURE — 11102 TANGNTL BX SKIN SINGLE LES: CPT

## 2021-04-28 PROCEDURE — 99214 OFFICE O/P EST MOD 30 MIN: CPT | Mod: 25

## 2021-04-30 NOTE — REVIEW OF SYSTEMS
[Joint Pain] : joint pain [Skin Rash] : skin rash [Negative] : Heme/Lymph [FreeTextEntry9] : complaining of pain and numbness/tingling in the R wrist; also c/o of muscle spasms/tightness in the L shoulder blade to the chest [de-identified] : c/o skin rash, red spots covering her R/L arms

## 2021-04-30 NOTE — PLAN
[FreeTextEntry1] : # Blood work script\par # Patient advised to wear a hand splint to reduce pain and stabilize wrist\par - Patient may use OTC NSAIDs if needed \par # Follow up in 2 weeks

## 2021-04-30 NOTE — ASSESSMENT
[FreeTextEntry1] : ASSESSMENT: Ms. ANDER ZAMUDIO is a 36 year old female presenting to the clinic today complaining of pain and numbness/tingling in the R wrist.\par \par # PE/vitals obtained - normal\par \par # No joint swelling upon PE, however patient exhibits signs of carpal tunnel upon review of symptoms\par \par # Examined rash/red spots on patient's R/L arms - no treatment is needed at this time

## 2021-04-30 NOTE — PHYSICAL EXAM
[No Acute Distress] : no acute distress [Well Nourished] : well nourished [Well Developed] : well developed [Well-Appearing] : well-appearing [Normal Sclera/Conjunctiva] : normal sclera/conjunctiva [PERRL] : pupils equal round and reactive to light [EOMI] : extraocular movements intact [Normal Outer Ear/Nose] : the outer ears and nose were normal in appearance [Normal Oropharynx] : the oropharynx was normal [No JVD] : no jugular venous distention [No Lymphadenopathy] : no lymphadenopathy [Supple] : supple [Thyroid Normal, No Nodules] : the thyroid was normal and there were no nodules present [No Respiratory Distress] : no respiratory distress  [No Accessory Muscle Use] : no accessory muscle use [Clear to Auscultation] : lungs were clear to auscultation bilaterally [Normal Rate] : normal rate  [Regular Rhythm] : with a regular rhythm [Normal S1, S2] : normal S1 and S2 [No Murmur] : no murmur heard [No Carotid Bruits] : no carotid bruits [No Abdominal Bruit] : a ~M bruit was not heard ~T in the abdomen [No Varicosities] : no varicosities [Pedal Pulses Present] : the pedal pulses are present [No Edema] : there was no peripheral edema [No Palpable Aorta] : no palpable aorta [No Extremity Clubbing/Cyanosis] : no extremity clubbing/cyanosis [Soft] : abdomen soft [Non Tender] : non-tender [Non-distended] : non-distended [No Masses] : no abdominal mass palpated [No HSM] : no HSM [Normal Bowel Sounds] : normal bowel sounds [Normal Posterior Cervical Nodes] : no posterior cervical lymphadenopathy [Normal Anterior Cervical Nodes] : no anterior cervical lymphadenopathy [No CVA Tenderness] : no CVA  tenderness [No Spinal Tenderness] : no spinal tenderness [No Joint Swelling] : no joint swelling [Grossly Normal Strength/Tone] : grossly normal strength/tone [No Rash] : no rash [Coordination Grossly Intact] : coordination grossly intact [No Focal Deficits] : no focal deficits [Normal Gait] : normal gait [Deep Tendon Reflexes (DTR)] : deep tendon reflexes were 2+ and symmetric [Normal Affect] : the affect was normal [Normal Insight/Judgement] : insight and judgment were intact [de-identified] : No joint swelling upon PE, however patient exhibits signs of carpal tunnel upon review of symptoms [de-identified] : Examined rash/red spots on patient's R/L arms - no treatment is needed at this time

## 2021-04-30 NOTE — HISTORY OF PRESENT ILLNESS
[FreeTextEntry8] : Ms. ANDER ZAMUDIO is a 36 year  old female presenting clinic today complaining of pain in the R wrist. Patient describes symptoms as beginning 3 days ago and as constant. Reports that she is experiencing pain which feels like tingling. States that she sometimes wakes up in the night because her hands feel numb.\par \par Also states that sometimes she gets muscle spasms in the neck, and feels like the muscles from the L shoulder blade tighten and the tightness radiates to the chest. She is unsure if this feeling is because of a panic attack or not. States this muscle spasm and tightness occurs in frequently but notes that when it does happens it usually occurs after bending down to pick something up.\par \par Patient also presents with many red marks covering her R/L arms. States that these red spots appeared after she gave birth to her second child. States that after pregnancy she went to a dermatologist who prescribed her different creams. States that some creams just covered up the spots or did not resolve the issue. Patient is unsure the cause of the issue but is not interested in seeking treatment for the red spots at this time.

## 2021-04-30 NOTE — END OF VISIT
[FreeTextEntry2] : This note was written by BOSTON PINTO on 04/28/2021, acting solely as a scribe for Dr. Andreina Espinosa MD. \par \par All medical record entries made by the scribe, BOSTON PINTO, were at my, Dr. Nadia Ramirez MD, direction and personally dictated by me on 04/28/2021. I have personally reviewed the chart and agree that the record accurately reflects my personal performance and care.

## 2021-04-30 NOTE — ADDENDUM
[FreeTextEntry1] : I, Rose Mary Epps, verify that that I acted solely as a scribe for Dr. Andreina Esipnosa on this date, 04/28/2021.

## 2021-05-12 ENCOUNTER — APPOINTMENT (OUTPATIENT)
Dept: DERMATOLOGY | Facility: CLINIC | Age: 36
End: 2021-05-12
Payer: MEDICAID

## 2021-05-12 PROCEDURE — 11104 PUNCH BX SKIN SINGLE LESION: CPT

## 2021-05-12 PROCEDURE — 99072 ADDL SUPL MATRL&STAF TM PHE: CPT

## 2021-05-26 ENCOUNTER — APPOINTMENT (OUTPATIENT)
Dept: DERMATOLOGY | Facility: CLINIC | Age: 36
End: 2021-05-26
Payer: MEDICAID

## 2021-05-26 PROCEDURE — ZZZZZ: CPT

## 2021-06-02 ENCOUNTER — APPOINTMENT (OUTPATIENT)
Dept: FAMILY MEDICINE | Facility: CLINIC | Age: 36
End: 2021-06-02

## 2021-06-10 ENCOUNTER — APPOINTMENT (OUTPATIENT)
Dept: DERMATOLOGY | Facility: CLINIC | Age: 36
End: 2021-06-10
Payer: MEDICAID

## 2021-06-10 PROCEDURE — 99213 OFFICE O/P EST LOW 20 MIN: CPT

## 2021-07-01 ENCOUNTER — APPOINTMENT (OUTPATIENT)
Dept: FAMILY MEDICINE | Facility: CLINIC | Age: 36
End: 2021-07-01
Payer: MEDICAID

## 2021-07-01 VITALS
WEIGHT: 255 LBS | BODY MASS INDEX: 43.54 KG/M2 | SYSTOLIC BLOOD PRESSURE: 120 MMHG | HEIGHT: 64 IN | DIASTOLIC BLOOD PRESSURE: 82 MMHG | HEART RATE: 82 BPM | TEMPERATURE: 97.4 F

## 2021-07-01 DIAGNOSIS — N92.6 IRREGULAR MENSTRUATION, UNSPECIFIED: ICD-10-CM

## 2021-07-01 PROCEDURE — 99213 OFFICE O/P EST LOW 20 MIN: CPT

## 2021-07-04 PROBLEM — N92.6 IRREGULAR MENSES: Status: ACTIVE | Noted: 2020-05-04

## 2021-07-04 NOTE — PLAN
[FreeTextEntry1] : WILL CHECK HCG\par GYN FOLLOW-UP\par HEALTHY DIET AND LIFESTYLE MODIFICATIONS\par HEALTHY WEIGHT LOSS \par CHECK LAB WORK INCLUDING TFT'S\par REFERRAL TO WEIGHT LOSS CLINIC\par CALL WITH ANY QUESTIONS, CONCERNS OR CHANGES

## 2021-07-04 NOTE — PHYSICAL EXAM
[No Acute Distress] : no acute distress [Well Nourished] : well nourished [Well-Appearing] : well-appearing [No Respiratory Distress] : no respiratory distress  [No Accessory Muscle Use] : no accessory muscle use [Clear to Auscultation] : lungs were clear to auscultation bilaterally [Normal Rate] : normal rate  [Regular Rhythm] : with a regular rhythm [Normal S1, S2] : normal S1 and S2 [Soft] : abdomen soft [Non Tender] : non-tender [Normal Bowel Sounds] : normal bowel sounds [Speech Grossly Normal] : speech grossly normal [Normal Affect] : the affect was normal [Normal Mood] : the mood was normal

## 2021-07-04 NOTE — HISTORY OF PRESENT ILLNESS
[FreeTextEntry1] : weight [de-identified] : MS. ZAMUDIO IS A PLEASANT 37 YO PRESENTING FOR FOLLOW-UP.  WOULD LIKE TO LOOSE WEIGHT.  EXERCISING.  GOES TO THE GYM.  LOSES SOME WEIGHT AND THEN GAINS IT AGAIN.  HAS NOT BEEN GOING TO THE GYM LATELY.  TRYING TO EAT HEALTHY.  CUTTING DOWN ON CARBOHYDRATES.  LESS RED MEAT.  DRINKING WATER.  IS OTHERWISE DOING WELL.\par IRREGULAR MENSES.  SEEING GYN DR. FAIRCHILD

## 2021-07-08 NOTE — ED ADULT NURSE NOTE - TEMPLATE LIST FOR HEAD TO TOE ASSESSMENT
7/8/2021  Tasneem Metz    Goals discussed during today's visit:    1. Use the PLATE method when portioning up your plate.  Try to add more vegetables to your meals.  Try to eat \"closer to nature.\"     2. Replace your current snacks with something like: apples and pb, carrots and pb, cheese and a handful of crackers, piece of fruit or fruit mix.     3. Work to increase water consumption and decrease soda consumption.    4. Test your blood sugar and record the number in your log book.  Test whenever you're feeling hot or have a headache - write down symptoms or what you may have eaten or activity level.  Bring that in along with your meter when you see Patti.    Other important steps to taking care of your diabetes:    1. Remember to test your blood sugars, write down your numbers, and bring this information to all healthcare appointments where diabetes is discussed.       2. Take your diabetes medications as prescribed by your doctor.    3. Contact your doctor if your blood sugar readings are >300 for two tests in a row.      Follow up with Dietitian:    Please contact me if you have any questions or concerns.  Jodi Quijano, MPH, MS, RD, CD, CDE  [facility]  [phone], extension [phone]       Abdominal Pain, N/V/D

## 2021-09-23 ENCOUNTER — APPOINTMENT (OUTPATIENT)
Dept: OBGYN | Facility: CLINIC | Age: 36
End: 2021-09-23

## 2021-10-08 ENCOUNTER — APPOINTMENT (OUTPATIENT)
Dept: DERMATOLOGY | Facility: CLINIC | Age: 36
End: 2021-10-08
Payer: MEDICAID

## 2021-10-08 PROCEDURE — 99214 OFFICE O/P EST MOD 30 MIN: CPT

## 2021-10-12 ENCOUNTER — APPOINTMENT (OUTPATIENT)
Dept: FAMILY MEDICINE | Facility: CLINIC | Age: 36
End: 2021-10-12

## 2021-10-19 ENCOUNTER — APPOINTMENT (OUTPATIENT)
Dept: OBGYN | Facility: CLINIC | Age: 36
End: 2021-10-19

## 2021-10-19 ENCOUNTER — APPOINTMENT (OUTPATIENT)
Dept: FAMILY MEDICINE | Facility: CLINIC | Age: 36
End: 2021-10-19
Payer: MEDICAID

## 2021-10-19 VITALS
BODY MASS INDEX: 42.68 KG/M2 | HEART RATE: 88 BPM | DIASTOLIC BLOOD PRESSURE: 80 MMHG | WEIGHT: 250 LBS | SYSTOLIC BLOOD PRESSURE: 140 MMHG | HEIGHT: 64 IN

## 2021-10-19 DIAGNOSIS — H93.12 TINNITUS, LEFT EAR: ICD-10-CM

## 2021-10-19 DIAGNOSIS — R74.01 ELEVATION OF LEVELS OF LIVER TRANSAMINASE LEVELS: ICD-10-CM

## 2021-10-19 LAB — HCG UR QL: NEGATIVE

## 2021-10-19 PROCEDURE — 81025 URINE PREGNANCY TEST: CPT

## 2021-10-19 PROCEDURE — 99214 OFFICE O/P EST MOD 30 MIN: CPT | Mod: 25

## 2021-10-21 NOTE — REVIEW OF SYSTEMS
[Headache] : headache [Negative] : Integumentary [Fainting] : no fainting [de-identified] : SEE HPI [FreeTextEntry4] : SEE HPI

## 2021-10-21 NOTE — HISTORY OF PRESENT ILLNESS
[FreeTextEntry1] : f/u lab work [de-identified] : MS. ZAMUDIO IS A PLEASANT 35 YO PRESENTING FOR ACUTE VISIT.  GETTING HEADACHES. A COUPLE MONTHS.  HISTORY OF MIGRAINES.  GETTING MORE FREQUENTLY.  LAST AN HOUR OR SO.  DOES NOT WAKE UP WITH HEADACHES.  SOMETIMES DIZZINESS SENSATION.  A LITTLE BLURRY VISION AT TIMES.  RINGING ISOLATED TO LEFT EAR THE PAST ONE MONTH.  NO FALLS OR TRAUMA.   LIGHTS BOTHER HER.  TAKES IBUPROFEN WHICH HELPS AT TIMES.  NO SYNCOPE, CHEST PAIN, PALPITATIONS OR EDEMA.  NO PRIOR NEUROLOGY EVALUATION.  NO RECENT BRAIN IMAGING.  FEELING INCREASED STRESS AT HOME TAKING CARE OF FAMILY.  NOT DOWN OR DEPRESSED.  RELATIONSHIP STRAINED WITH HER .  FEELS SAFE.  NO ABUSE OR NEGLECT.  NO SUICIDAL/HOMICIDAL IDEATIONS OR PLANS.  GOES OUT SHOPPING OR WALKING FOR STRESS RELIEF.  HAS NEVER SPOKEN TO A THERAPIST IN THE PAST.  HISTORY OF HYPERCHOLESTEROLEMIA.  IS TRYING TO BE MORE CAREFUL WITH HER DIET.  PORTIONS ARE OKAY.  NOT ROUTINELY EXERCISING.  MAY HAVE HAD TYLENOL PRIOR TO LAB WORK. NOT ON VITAMIN D SUPPLEMENTS.\par LMP: IRREGULAR, DOES NOT GET MONTHLY

## 2021-10-21 NOTE — PHYSICAL EXAM
[No Acute Distress] : no acute distress [Well Nourished] : well nourished [Well-Appearing] : well-appearing [Normal Sclera/Conjunctiva] : normal sclera/conjunctiva [PERRL] : pupils equal round and reactive to light [EOMI] : extraocular movements intact [Normal Outer Ear/Nose] : the outer ears and nose were normal in appearance [Normal Oropharynx] : the oropharynx was normal [Normal TMs] : both tympanic membranes were normal [No Lymphadenopathy] : no lymphadenopathy [Supple] : supple [No Respiratory Distress] : no respiratory distress  [No Accessory Muscle Use] : no accessory muscle use [Clear to Auscultation] : lungs were clear to auscultation bilaterally [Normal Rate] : normal rate  [Regular Rhythm] : with a regular rhythm [Normal S1, S2] : normal S1 and S2 [Soft] : abdomen soft [Non Tender] : non-tender [Normal Bowel Sounds] : normal bowel sounds [No Joint Swelling] : no joint swelling [Grossly Normal Strength/Tone] : grossly normal strength/tone [Coordination Grossly Intact] : coordination grossly intact [No Focal Deficits] : no focal deficits [Normal Gait] : normal gait [Deep Tendon Reflexes (DTR)] : deep tendon reflexes were 2+ and symmetric [Speech Grossly Normal] : speech grossly normal [Normal Affect] : the affect was normal [Normal Mood] : the mood was normal

## 2021-11-02 ENCOUNTER — APPOINTMENT (OUTPATIENT)
Dept: MRI IMAGING | Facility: CLINIC | Age: 36
End: 2021-11-02
Payer: MEDICAID

## 2021-11-02 ENCOUNTER — OUTPATIENT (OUTPATIENT)
Dept: OUTPATIENT SERVICES | Facility: HOSPITAL | Age: 36
LOS: 1 days | End: 2021-11-02

## 2021-11-02 DIAGNOSIS — R51.9 HEADACHE, UNSPECIFIED: ICD-10-CM

## 2021-11-02 DIAGNOSIS — H93.12 TINNITUS, LEFT EAR: ICD-10-CM

## 2021-11-02 PROCEDURE — 70553 MRI BRAIN STEM W/O & W/DYE: CPT | Mod: 26

## 2021-12-01 ENCOUNTER — APPOINTMENT (OUTPATIENT)
Dept: FAMILY MEDICINE | Facility: CLINIC | Age: 36
End: 2021-12-01
Payer: MEDICAID

## 2021-12-01 VITALS
SYSTOLIC BLOOD PRESSURE: 130 MMHG | BODY MASS INDEX: 42.51 KG/M2 | HEIGHT: 64 IN | HEART RATE: 78 BPM | WEIGHT: 249 LBS | DIASTOLIC BLOOD PRESSURE: 70 MMHG

## 2021-12-01 DIAGNOSIS — S29.012A STRAIN OF MUSCLE AND TENDON OF BACK WALL OF THORAX, INITIAL ENCOUNTER: ICD-10-CM

## 2021-12-01 DIAGNOSIS — Z23 ENCOUNTER FOR IMMUNIZATION: ICD-10-CM

## 2021-12-01 PROCEDURE — 99213 OFFICE O/P EST LOW 20 MIN: CPT | Mod: 25

## 2021-12-01 PROCEDURE — G0008: CPT

## 2021-12-01 PROCEDURE — 90686 IIV4 VACC NO PRSV 0.5 ML IM: CPT

## 2021-12-05 PROBLEM — Z23 INFLUENZA VACCINE NEEDED: Status: ACTIVE | Noted: 2021-12-01

## 2021-12-05 PROBLEM — S29.012A MUSCLE STRAIN OF UPPER BACK: Status: ACTIVE | Noted: 2021-12-01

## 2021-12-05 NOTE — HISTORY OF PRESENT ILLNESS
[FreeTextEntry8] : MS. ZAMUDIO IS A PLEASANT 37 YO PRESENTING FOR ACUTE VISIT.  THE LEFT SIDE OF NECK BOTHERING HER.  STARTED YESTERDAY.  WAS CLEANING UP AND DOING A LOT OF ACTIVITY.  STARTED AFTER THAT.  NO FALLS OR TRAUMA.  NO NUMBNESS/TINGLING EXTREMITIES.  NO MEDICATIONS TAKEN FOR SYMPTOMS.  NOT CONSTANT DISCOMFORT.  MORE WITH MOVEMENT.  SHOULDER IS OKAY.  HAS NOT SEEN NEUROLOGIST AS DIRECTED.  DOES NOT WANT MUSCLE RELAXANT PILL.  NO OTHER COMPLAINTS TODAY.

## 2021-12-05 NOTE — PHYSICAL EXAM
[No Acute Distress] : no acute distress [Well Nourished] : well nourished [Well-Appearing] : well-appearing [No Lymphadenopathy] : no lymphadenopathy [Supple] : supple [No Respiratory Distress] : no respiratory distress  [No Accessory Muscle Use] : no accessory muscle use [Clear to Auscultation] : lungs were clear to auscultation bilaterally [Normal Rate] : normal rate  [Regular Rhythm] : with a regular rhythm [Normal S1, S2] : normal S1 and S2 [No Joint Swelling] : no joint swelling [Grossly Normal Strength/Tone] : grossly normal strength/tone [Coordination Grossly Intact] : coordination grossly intact [No Focal Deficits] : no focal deficits [Normal Gait] : normal gait [Deep Tendon Reflexes (DTR)] : deep tendon reflexes were 2+ and symmetric [de-identified] : NON-TENDER.  GOOD ROM

## 2021-12-05 NOTE — PLAN
[FreeTextEntry1] : FLU VACCINE GIVEN AND TOLERATED WELL \par AVOID HEAVY LIFTING OR ACTIVITIES CAUSING PAIN\par LIGHT STRETCHING\par RX NAPROXEN - TO EAT WITH MEDICATION\par CALL IF SYMPTOMS PERSIST/WORSEN; WILL CONSIDER IMAGING AT THAT TIME\par CALL WITH ANY QUESTIONS, CONCERNS OR CHANGES

## 2022-03-02 ENCOUNTER — APPOINTMENT (OUTPATIENT)
Dept: FAMILY MEDICINE | Facility: CLINIC | Age: 37
End: 2022-03-02
Payer: MEDICAID

## 2022-03-02 VITALS
DIASTOLIC BLOOD PRESSURE: 70 MMHG | BODY MASS INDEX: 42.68 KG/M2 | WEIGHT: 250 LBS | SYSTOLIC BLOOD PRESSURE: 124 MMHG | HEIGHT: 64 IN | HEART RATE: 77 BPM

## 2022-03-02 DIAGNOSIS — M54.2 CERVICALGIA: ICD-10-CM

## 2022-03-02 PROCEDURE — 81025 URINE PREGNANCY TEST: CPT

## 2022-03-02 PROCEDURE — 99213 OFFICE O/P EST LOW 20 MIN: CPT | Mod: 25

## 2022-03-06 PROBLEM — M54.2 NECK PAIN: Status: ACTIVE | Noted: 2022-03-02

## 2022-03-06 LAB
HCG UR QL: NEGATIVE
QUALITY CONTROL: YES

## 2022-03-06 NOTE — HISTORY OF PRESENT ILLNESS
----- Message from Cayetano Solitario sent at 12/21/2017  2:44 PM CST -----  Contact: Patient [phone]    Is this a refill or new RX:  Refill    RX name and strength: blood sugar diagnostic Strp     Pharmacy name and phone #: [facility] [place] - [id number][phone] (phone) [phone] (Fax)    Comments:  The box says accu chek strips but, I couldn't find it on the medication.         [FreeTextEntry1] : NECK PAIN [de-identified] : MS. ZAMUDIO IS A PLEASANT 38 YO PRESENTING FOR FOLLOW-UP.  NECK PAIN RESTARTED LAST WEEK.  HAS HAD NO FALLS OR TRAUMA.  NO NUMBNESS/TINGLING EXTREMITIES.  TOOK TYLENOL.  NAPROXEN PREVIOUSLY HELPED.  IT IS MORE WITH MOVEMENT.  NO OTHER MUSCLE/JOINT PAINS.  IS TO SEE NEUROLOGY ON 3/7 FOR HEADACHES.\par lmp: 3 weeks ago

## 2022-03-06 NOTE — PLAN
[FreeTextEntry1] : POCT UCG DONE\par CHECK XRAY C-SPINE\par AVOID HEAVY LIFTING OR ACTIVITIES CAUSING PAIN\par LIGHT STRETCHING\par RX NAPROXEN RENEWED - TO EAT WITH MEDICATION\par UPCOMING APPOINTMENT WITH NEUROLOGY\par CALL WITH ANY QUESTIONS, CONCERNS OR CHANGES

## 2022-03-07 ENCOUNTER — APPOINTMENT (OUTPATIENT)
Dept: NEUROLOGY | Facility: CLINIC | Age: 37
End: 2022-03-07
Payer: MEDICAID

## 2022-03-07 VITALS
DIASTOLIC BLOOD PRESSURE: 78 MMHG | SYSTOLIC BLOOD PRESSURE: 124 MMHG | BODY MASS INDEX: 42.68 KG/M2 | HEIGHT: 64 IN | WEIGHT: 250 LBS

## 2022-03-07 PROCEDURE — 99204 OFFICE O/P NEW MOD 45 MIN: CPT

## 2022-03-07 RX ORDER — IBUPROFEN 600 MG/1
600 TABLET, FILM COATED ORAL 3 TIMES DAILY
Qty: 90 | Refills: 2 | Status: COMPLETED | COMMUNITY
Start: 2020-06-29 | End: 2022-03-07

## 2022-03-07 NOTE — ASSESSMENT
[FreeTextEntry1] : This is a 37-year-old woman with a long history of chronic headache.\par The description is suggestive of migraine that has evolved into a chronic daily pattern.\par Imaging has been negative.\par \par I have explained that there are essentially 2 strategies for dealing with chronic headaches.  The first is abortive medication of which there are numerous over-the-counter preparations as well as prescription options.  The second strategy is prophylactic medications.  I have explained that these are medications which prevent headaches when taken on a regular basis.  I have explained that there are several medications which have been found to be preventative against headaches.  I have further explained that none of the medications have an immediate effect.  They must build up in the system over a few weeks.  Most people notice that within a few weeks on the medication, the headache intensity is diminishing.  Within a few more weeks most people begin to notice that the frequency is decreasing as well.  I have explained that the preventive medications were all originally used for other conditions but were also found to work against chronic headaches.  The patient seemed to understand my explanation.\par \par I have suggested a trial of Topamax 25 mg to be taken at bedtime in an attempt to decrease the frequency and intensity of the headaches.  \par \par I have discussed the potential side effects of the medication with the patient and have advised the patient to call me should any new symptoms occur.\par \par I will see her back in a few months.\par \par

## 2022-03-07 NOTE — PHYSICAL EXAM
[General Appearance - Alert] : alert [Oriented To Time, Place, And Person] : oriented to person, place, and time [Affect] : the affect was normal [Memory Recent] : recent memory was not impaired [Memory Remote] : remote memory was not impaired [Cranial Nerves Optic (II)] : visual acuity intact bilaterally,  visual fields full to confrontation, pupils equal round and reactive to light [Cranial Nerves Oculomotor (III)] : extraocular motion intact [Cranial Nerves Trigeminal (V)] : facial sensation intact symmetrically [Cranial Nerves Facial (VII)] : face symmetrical [Cranial Nerves Vestibulocochlear (VIII)] : hearing was intact bilaterally [Cranial Nerves Glossopharyngeal (IX)] : tongue and palate midline [Cranial Nerves Accessory (XI - Cranial And Spinal)] : head turning and shoulder shrug symmetric [Cranial Nerves Hypoglossal (XII)] : there was no tongue deviation with protrusion [Motor Tone] : muscle tone was normal in all four extremities [Motor Strength] : muscle strength was normal in all four extremities [Sensation Tactile Decrease] : light touch was intact [Sensation Pain / Temperature Decrease] : pain and temperature was intact [Sensation Vibration Decrease] : vibration was intact [Abnormal Walk] : normal gait [2+] : Ankle jerk left 2+ [Optic Disc Abnormality] : the optic disc were normal in size and color [Dysarthria] : no dysarthria [Aphasia] : no dysphasia/aphasia [Romberg's Sign] : Romberg's sign was negtive [Coordination - Dysmetria Impaired Finger-to-Nose Bilateral] : not present [Plantar Reflex Right Only] : normal on the right [Plantar Reflex Left Only] : normal on the left

## 2022-03-07 NOTE — DATA REVIEWED
[de-identified] : Brain MRI was performed on 11/2/2021.\par The study was done pre and post contrast with attention to the internal auditory canals.\par The study was unremarkable.  Specifically there were no mass lesions in the internal auditory canals.\par There was no abnormal enhancement.

## 2022-03-07 NOTE — CONSULT LETTER
[Dear  ___] : Dear  [unfilled], [Courtesy Letter:] : I had the pleasure of seeing your patient, [unfilled], in my office today. [Please see my note below.] : Please see my note below. [Consult Closing:] : Thank you very much for allowing me to participate in the care of this patient.  If you have any questions, please do not hesitate to contact me. [Sincerely,] : Sincerely, [FreeTextEntry3] : Chepe Anglin MD.

## 2022-03-07 NOTE — HISTORY OF PRESENT ILLNESS
[FreeTextEntry1] : I saw this patient in the office today.\par \par She describes headaches for many years.\par This has progressed to the point where it is almost daily.\par They wax and wane in intensity.\par When severe, they are associated with nausea and photophobia.\par \par She has had multiple MRIs over the years which have been negative.

## 2022-06-08 ENCOUNTER — APPOINTMENT (OUTPATIENT)
Dept: BARIATRICS/WEIGHT MGMT | Facility: CLINIC | Age: 37
End: 2022-06-08
Payer: MEDICAID

## 2022-06-08 VITALS
OXYGEN SATURATION: 97 % | WEIGHT: 250.13 LBS | DIASTOLIC BLOOD PRESSURE: 87 MMHG | RESPIRATION RATE: 16 BRPM | SYSTOLIC BLOOD PRESSURE: 137 MMHG | HEIGHT: 63.5 IN | TEMPERATURE: 97.4 F | BODY MASS INDEX: 43.77 KG/M2 | HEART RATE: 84 BPM

## 2022-06-08 PROCEDURE — 99204 OFFICE O/P NEW MOD 45 MIN: CPT

## 2022-06-08 NOTE — ASSESSMENT
[FreeTextEntry1] : 37 year old woman with a hx of obesity for  most of her life kyrie with preg interested in wt loss. Suffers from migraines and is on topamax prn \par 1 Labs\par 2 Nutritionist  teodora \par 3 exercise: goal 150 min cardio , 60 min of weights\par 4 . will start to take your topiramate daily 25 mg \par 5 follow up with me \par 60 min \par

## 2022-06-08 NOTE — PHYSICAL EXAM
[Obese, well nourished, in no acute distress] : obese, well nourished, in no acute distress [Normal] : affect appropriate [de-identified] : obese, old striae

## 2022-06-08 NOTE — CONSULT LETTER
[Dear  ___] : Dear  [unfilled], [Consult Letter:] : I had the pleasure of evaluating your patient, [unfilled]. [Please see my note below.] : Please see my note below. [Consult Closing:] : Thank you very much for allowing me to participate in the care of this patient.  If you have any questions, please do not hesitate to contact me. [Sincerely,] : Sincerely, [FreeTextEntry3] : Nati Funes MD

## 2022-06-08 NOTE — HISTORY OF PRESENT ILLNESS
[Improved Health] : Improved health [Quality of Life] : Quality of life [Improved Mobility] : Improved mobility [Improved Looks/Aesthetics] : Improved looks/aesthetics [Improve Mood] : Improved mood [Improve Life Expectancy] : Improve life expectancy [Young Adult] : yound adult [Haven't tried anything yet] : haven't tried anything yet [Poor eating habits] : poor eating habits [Time management] : time management [Motivation] : motivation [Unsure what to eat] : unsure what to eat [Family life/pregnancy] : family life/pregnancy [8] : 8 [I usually sleep 6-8 hours] : I usually sleep 6-8 hours [Breakfast] : breakfast [Lunch] : lunch [Dinner] : dinner [Afternoon] : afternoon [Other: ___] : [unfilled] [1-2] : Meat, fish, poultry, eggs, cheese: 1-2 [Less than 1] : Sweets: less than 1 [6] : How many cups of water do you regularly drink per day: 6 [1] : Cups of coffee per day: 1 [Creamer] : creamer [Milk] : milk [Parent] : parent [Self] : self [2+ miles] : Walking distance capability: 2+ miles [Biking] : biking [Cardio machines: treadmill/spinning/elliptical] : cardio machines: treadmill/spinning/elliptical [Weight training] : weight training [0] : 0 [None] : none [FreeTextEntry3] : 250 [] : No [FreeTextEntry1] : 37 year old woman with a chronic history of wt gain since her pregnancies and never able to lose it but does not know how much she gained. She works as a  , enjoys it, has 2 children, 15, 9 and is quite busy,has a hx of migraines, HA about1/week \par \par Breakfast: \par chicken florentine crepe not usu\par usually \par cereal: [place] grahams, or cheerios honey nut \par 2% milk \par \par snack \par none \par \par Lunch \par tuna sandwich on wheat bread, \par \par \par snack\par +/-\par yogurt :vanilla dannon\par \par Dinner6\par rice and meat \par salad: vinaigrette:veggies, seeds, \par dessert none\par \par snack none \par \par exer: none\par \par sleep 7hours\par \par non ETOH

## 2022-06-13 ENCOUNTER — APPOINTMENT (OUTPATIENT)
Dept: NEUROLOGY | Facility: CLINIC | Age: 37
End: 2022-06-13

## 2022-06-29 ENCOUNTER — APPOINTMENT (OUTPATIENT)
Dept: BARIATRICS | Facility: CLINIC | Age: 37
End: 2022-06-29

## 2022-06-29 ENCOUNTER — APPOINTMENT (OUTPATIENT)
Dept: FAMILY MEDICINE | Facility: CLINIC | Age: 37
End: 2022-06-29

## 2022-06-29 VITALS
SYSTOLIC BLOOD PRESSURE: 114 MMHG | RESPIRATION RATE: 16 BRPM | OXYGEN SATURATION: 98 % | DIASTOLIC BLOOD PRESSURE: 64 MMHG | TEMPERATURE: 97.3 F | HEIGHT: 63.5 IN | HEART RATE: 83 BPM | WEIGHT: 249.13 LBS | BODY MASS INDEX: 43.59 KG/M2

## 2022-06-29 VITALS
WEIGHT: 251 LBS | BODY MASS INDEX: 43.92 KG/M2 | HEART RATE: 73 BPM | DIASTOLIC BLOOD PRESSURE: 80 MMHG | HEIGHT: 63.5 IN | SYSTOLIC BLOOD PRESSURE: 110 MMHG | OXYGEN SATURATION: 98 %

## 2022-06-29 DIAGNOSIS — Z01.818 ENCOUNTER FOR OTHER PREPROCEDURAL EXAMINATION: ICD-10-CM

## 2022-06-29 DIAGNOSIS — M25.531 PAIN IN RIGHT WRIST: ICD-10-CM

## 2022-06-29 LAB
HCG UR QL: NEGATIVE
QUALITY CONTROL: YES

## 2022-06-29 PROCEDURE — 81025 URINE PREGNANCY TEST: CPT

## 2022-06-29 PROCEDURE — G0447 BEHAVIOR COUNSEL OBESITY 15M: CPT

## 2022-06-29 PROCEDURE — 97804 MEDICAL NUTRITION GROUP: CPT

## 2022-06-29 PROCEDURE — 99214 OFFICE O/P EST MOD 30 MIN: CPT | Mod: 25

## 2022-06-30 ENCOUNTER — APPOINTMENT (OUTPATIENT)
Dept: SURGERY | Facility: CLINIC | Age: 37
End: 2022-06-30

## 2022-06-30 VITALS
SYSTOLIC BLOOD PRESSURE: 104 MMHG | RESPIRATION RATE: 16 BRPM | BODY MASS INDEX: 43.13 KG/M2 | HEIGHT: 63.5 IN | OXYGEN SATURATION: 95 % | TEMPERATURE: 97.3 F | WEIGHT: 246.5 LBS | HEART RATE: 80 BPM | DIASTOLIC BLOOD PRESSURE: 70 MMHG

## 2022-06-30 DIAGNOSIS — Z82.49 FAMILY HISTORY OF ISCHEMIC HEART DISEASE AND OTHER DISEASES OF THE CIRCULATORY SYSTEM: ICD-10-CM

## 2022-06-30 DIAGNOSIS — Z87.42 PERSONAL HISTORY OF OTHER DISEASES OF THE FEMALE GENITAL TRACT: ICD-10-CM

## 2022-06-30 DIAGNOSIS — Z83.3 FAMILY HISTORY OF DIABETES MELLITUS: ICD-10-CM

## 2022-06-30 PROCEDURE — 99204 OFFICE O/P NEW MOD 45 MIN: CPT

## 2022-06-30 NOTE — HISTORY OF PRESENT ILLNESS
[de-identified] : Ms. ZAMUDIO is a 37 year old morbidly obese woman, 5'3.5" and 246.8 pounds (BMI 43).  The patient presents requesting weight loss surgery. She has been more than 75 lb. overweight for the past 10 years and is currently at her greatest weight.  \par \par The patient has tried numerous weight loss programs including self-directed and physician supervised diets. Patient has not taken weight loss medication due to known side effects. She has lost up to 10 pounds on more than one occasion.\par \par The patient denies diabetes, shortness of breath with exertion, weight bearing joint pain, and low back pain.  She denies kidney, urinary tract disease, headaches, dizziness, seizure or neurological disorder.  She denies untreated thyroid, adrenal, pituitary disease, depression or psychiatric disorder.  The patient denies gallstones, ulcer or liver disease.  She denies high blood pressure, high cholesterol, heart attack or stroke.  She denies anemia, bleeding disorder, thrombosis, clotting disorder or easy bruisability.  She denies peripheral edema and difficulty sleeping. She denies irregular menses, hirsutism, acne, infertility or stress urinary incontinence.\par \par denies reflux\par denies smoking

## 2022-06-30 NOTE — PLAN
[FreeTextEntry1] : The risks, benefits and alternatives of laparoscopic/robotic gastric bypass and sleeve gastrectomy were discussed at length and all questions were answered. The patient appears to understand and wishes to proceed. Plan for sleeve gastrectomy.\par \par The patient was given the following instructions:\par \par 1.   She needs a complete medical evaluation including echocardiogram, stress test, pulmonary function test and evaluation for sleep apnea.\par \par 2.   She needs an upper endoscopy.\par \par 3.   She needs dietary and psychological evaluations.\par \par 4.   She must attend a preoperative support group meeting.\par \par 5.   She must undergo a right upper quadrant ultrasound and upper GI x-ray.\par \par The patient clearly understood that surgery would only be scheduled if there are no medical or psychiatric contraindications and a second office visit is required.  \par

## 2022-06-30 NOTE — ASSESSMENT
[FreeTextEntry1] : Ms. ZAMUDIO is a 37 year old woman with morbid obesity who is unable to lose weight and improve her co-morbid conditions with medical management including diet and exercise. She wishes to proceed with planning for bariatric surgery.\par

## 2022-07-04 NOTE — HISTORY OF PRESENT ILLNESS
[FreeTextEntry8] : MS. ZAMUDIO IS A PLEASANT 36 YO PRESENTING FOR ACUTE VISIT.  STATES SHE STARTED WORKING AND NOW HER RIGHT WRIST HAS BEEN BOTHERING HER.  WEARING A BRACE.  TOOK NAPROXEN.  NO NUMBNESS/TINGLING EXTREMITIES.  HAS HAD NO TRAUMA.  DENIES SWELLING.  IT IS WORSE WITH ACTIVITY.  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  NOT IMPROVING.  NO PRIOR EPISODE IN THE PAST.  HISTORY OF OBESITY.  PLANS TO SEE BARIATRIC SURGEON AND NEED REFERRAL FOR EVALUATION.\par lmp: one week ago

## 2022-07-04 NOTE — PLAN
[FreeTextEntry1] : POCT URINE PREGNANCY NEGATIVE\par WILL GET XRAY IMAGING OF WRIST IF PAIN PERSISTS\par AVOID ACTIVITIES CAUSING PAIN AND REPETITIVE MOVEMENTS\par NAPROXEN 500 MG BID FOR THE NEXT WEEK\par REFERRAL TO BARIATRIC SURGERY\par HEALTHY DIET AND LIFESTYLE MODIFICATIONS\par HEALTHY WEIGHT LOSS \par OBESITY MEDICINE CONSULTANT NOTE REVIEWED\par CALL WITH ANY QUESTIONS, CONCERNS OR CHANGES

## 2022-07-05 DIAGNOSIS — Z01.811 ENCOUNTER FOR PREPROCEDURAL RESPIRATORY EXAMINATION: ICD-10-CM

## 2022-07-05 PROBLEM — Z83.3 FAMILY HISTORY OF DIABETES MELLITUS: Status: ACTIVE | Noted: 2022-07-05

## 2022-07-05 PROBLEM — Z87.42 HISTORY OF FEMALE INFERTILITY: Status: RESOLVED | Noted: 2019-08-06 | Resolved: 2022-07-05

## 2022-07-05 PROBLEM — Z82.49 FAMILY HISTORY OF HEART DISEASE: Status: ACTIVE | Noted: 2022-07-05

## 2022-07-07 ENCOUNTER — OUTPATIENT (OUTPATIENT)
Dept: OUTPATIENT SERVICES | Facility: HOSPITAL | Age: 37
LOS: 1 days | End: 2022-07-07

## 2022-07-07 ENCOUNTER — APPOINTMENT (OUTPATIENT)
Dept: ULTRASOUND IMAGING | Facility: CLINIC | Age: 37
End: 2022-07-07

## 2022-07-07 DIAGNOSIS — E55.9 VITAMIN D DEFICIENCY, UNSPECIFIED: ICD-10-CM

## 2022-07-07 PROCEDURE — 76700 US EXAM ABDOM COMPLETE: CPT | Mod: 26

## 2022-07-12 ENCOUNTER — NON-APPOINTMENT (OUTPATIENT)
Age: 37
End: 2022-07-12

## 2022-07-12 ENCOUNTER — APPOINTMENT (OUTPATIENT)
Dept: CARDIOLOGY | Facility: CLINIC | Age: 37
End: 2022-07-12

## 2022-07-12 VITALS
SYSTOLIC BLOOD PRESSURE: 130 MMHG | DIASTOLIC BLOOD PRESSURE: 80 MMHG | HEART RATE: 80 BPM | WEIGHT: 254 LBS | BODY MASS INDEX: 45 KG/M2 | OXYGEN SATURATION: 95 % | TEMPERATURE: 98.2 F | HEIGHT: 63 IN

## 2022-07-12 VITALS — SYSTOLIC BLOOD PRESSURE: 126 MMHG | DIASTOLIC BLOOD PRESSURE: 84 MMHG

## 2022-07-12 LAB
25(OH)D3 SERPL-MCNC: 18.9 NG/ML
ALBUMIN SERPL ELPH-MCNC: 4.4 G/DL
ALP BLD-CCNC: 104 U/L
ALT SERPL-CCNC: 25 U/L
ANION GAP SERPL CALC-SCNC: 13 MMOL/L
APTT BLD: 35.9 SEC
AST SERPL-CCNC: 25 U/L
BASOPHILS # BLD AUTO: 0.03 K/UL
BASOPHILS NFR BLD AUTO: 0.5 %
BILIRUB DIRECT SERPL-MCNC: 0.1 MG/DL
BILIRUB INDIRECT SERPL-MCNC: 0.3 MG/DL
BILIRUB SERPL-MCNC: 0.4 MG/DL
BUN SERPL-MCNC: 11 MG/DL
CALCIUM SERPL-MCNC: 9.6 MG/DL
CHLORIDE SERPL-SCNC: 103 MMOL/L
CHOLEST SERPL-MCNC: 213 MG/DL
CO2 SERPL-SCNC: 22 MMOL/L
CREAT SERPL-MCNC: 0.52 MG/DL
EGFR: 123 ML/MIN/1.73M2
EOSINOPHIL # BLD AUTO: 0.22 K/UL
EOSINOPHIL NFR BLD AUTO: 3.7 %
ESTIMATED AVERAGE GLUCOSE: 103 MG/DL
FERRITIN SERPL-MCNC: 57 NG/ML
FOLATE SERPL-MCNC: 7.2 NG/ML
GLUCOSE SERPL-MCNC: 94 MG/DL
HBA1C MFR BLD HPLC: 5.2 %
HCG SERPL QL: NEGATIVE
HCT VFR BLD CALC: 43.7 %
HDLC SERPL-MCNC: 35 MG/DL
HGB BLD-MCNC: 14.1 G/DL
IMM GRANULOCYTES NFR BLD AUTO: 0.2 %
INR PPP: 1.05 RATIO
IRON SATN MFR SERPL: 36 %
IRON SERPL-MCNC: 129 UG/DL
LDLC SERPL CALC-MCNC: 139 MG/DL
LYMPHOCYTES # BLD AUTO: 1.85 K/UL
LYMPHOCYTES NFR BLD AUTO: 30.9 %
MAGNESIUM SERPL-MCNC: 1.9 MG/DL
MAN DIFF?: NORMAL
MCHC RBC-ENTMCNC: 29.7 PG
MCHC RBC-ENTMCNC: 32.3 GM/DL
MCV RBC AUTO: 92 FL
MONOCYTES # BLD AUTO: 0.32 K/UL
MONOCYTES NFR BLD AUTO: 5.3 %
NEUTROPHILS # BLD AUTO: 3.56 K/UL
NEUTROPHILS NFR BLD AUTO: 59.4 %
NONHDLC SERPL-MCNC: 178 MG/DL
PAPP-A SERPL-ACNC: <1 MIU/ML
PHOSPHATE SERPL-MCNC: 3.1 MG/DL
PLATELET # BLD AUTO: 345 K/UL
POTASSIUM SERPL-SCNC: 4.6 MMOL/L
PROT SERPL-MCNC: 7.7 G/DL
PT BLD: 12.3 SEC
RBC # BLD: 4.75 M/UL
RBC # FLD: 12.9 %
SODIUM SERPL-SCNC: 138 MMOL/L
T4 SERPL-MCNC: 7.3 UG/DL
TIBC SERPL-MCNC: 363 UG/DL
TRIGL SERPL-MCNC: 191 MG/DL
TSH SERPL-ACNC: 3.66 UIU/ML
UIBC SERPL-MCNC: 234 UG/DL
WBC # FLD AUTO: 5.99 K/UL

## 2022-07-12 PROCEDURE — 93000 ELECTROCARDIOGRAM COMPLETE: CPT

## 2022-07-12 PROCEDURE — 99203 OFFICE O/P NEW LOW 30 MIN: CPT

## 2022-07-12 RX ORDER — LIDOCAINE HYDROCHLORIDE 20 MG/ML
2 SOLUTION ORAL; TOPICAL
Qty: 200 | Refills: 0 | Status: DISCONTINUED | COMMUNITY
Start: 2022-05-24 | End: 2022-07-12

## 2022-07-13 LAB
A-TOCOPHEROL VIT E SERPL-MCNC: 12.8 MG/L
BETA+GAMMA TOCOPHEROL SERPL-MCNC: 2.4 MG/L
VIT A SERPL-MCNC: 41.8 UG/DL
VIT B12 USB SERPL-MCNC: 989 PG/ML

## 2022-07-14 LAB
COPPER SERPL-MCNC: 142 UG/DL
ZINC SERPL-MCNC: 79 UG/DL

## 2022-07-15 LAB
VIT B1 SERPL-MCNC: 122.2 NMOL/L
VIT B2 SERPL-MCNC: 168 UG/L
VIT C SERPL-MCNC: 0.3 MG/DL

## 2022-07-19 LAB — VIT B6 SERPL-MCNC: 6.4 UG/L

## 2022-08-01 ENCOUNTER — APPOINTMENT (OUTPATIENT)
Dept: CARDIOLOGY | Facility: CLINIC | Age: 37
End: 2022-08-01

## 2022-08-01 ENCOUNTER — TRANSCRIPTION ENCOUNTER (OUTPATIENT)
Age: 37
End: 2022-08-01

## 2022-08-01 PROCEDURE — 93306 TTE W/DOPPLER COMPLETE: CPT

## 2022-08-03 ENCOUNTER — APPOINTMENT (OUTPATIENT)
Dept: BARIATRICS | Facility: CLINIC | Age: 37
End: 2022-08-03

## 2022-08-03 VITALS
OXYGEN SATURATION: 98 % | HEIGHT: 62.5 IN | HEART RATE: 77 BPM | SYSTOLIC BLOOD PRESSURE: 125 MMHG | BODY MASS INDEX: 44.9 KG/M2 | WEIGHT: 250.25 LBS | DIASTOLIC BLOOD PRESSURE: 84 MMHG | TEMPERATURE: 97.5 F | RESPIRATION RATE: 16 BRPM

## 2022-08-03 PROCEDURE — 97802 MEDICAL NUTRITION INDIV IN: CPT

## 2022-08-04 ENCOUNTER — OUTPATIENT (OUTPATIENT)
Dept: OUTPATIENT SERVICES | Facility: HOSPITAL | Age: 37
LOS: 1 days | End: 2022-08-04
Payer: MEDICAID

## 2022-08-04 DIAGNOSIS — E66.01 MORBID (SEVERE) OBESITY DUE TO EXCESS CALORIES: ICD-10-CM

## 2022-08-04 PROCEDURE — 74246 X-RAY XM UPR GI TRC 2CNTRST: CPT

## 2022-08-04 PROCEDURE — 74246 X-RAY XM UPR GI TRC 2CNTRST: CPT | Mod: 26

## 2022-08-05 ENCOUNTER — APPOINTMENT (OUTPATIENT)
Dept: CARDIOLOGY | Facility: CLINIC | Age: 37
End: 2022-08-05

## 2022-08-05 DIAGNOSIS — Z01.810 ENCOUNTER FOR PREPROCEDURAL CARDIOVASCULAR EXAMINATION: ICD-10-CM

## 2022-08-05 LAB — MENADIONE SERPL-MCNC: 0.71 NG/ML

## 2022-08-05 PROCEDURE — 93015 CV STRESS TEST SUPVJ I&R: CPT

## 2022-08-09 NOTE — ADDENDUM
[FreeTextEntry1] : Echocardiogram and exercise treadmill stress test are normal. Elevated DBP prior to exercise but normotensive in office vitals, no indication for antihypertensive. \par -patient is currently optimized and without cardiac contraindication to proceed with elective bariatric surgery.

## 2022-08-09 NOTE — PHYSICAL EXAM

## 2022-08-09 NOTE — DISCUSSION/SUMMARY
[EKG obtained to assist in diagnosis and management of assessed problem(s)] : EKG obtained to assist in diagnosis and management of assessed problem(s) [FreeTextEntry1] : 37F h/o migraine and morbid obesity (BMI 45) planning for bariatric surgery with bypass/sleeve gastrectomy presents for preoperative cardiac risk evaluation. \par \par No cardiac complains, EKG within normal, will obtain baseline echocardiogram and exercise treadmill EKG test for pre-bariatric surgery cardiac assessment. If results are normal then no cardiac contraindication to proceed with the surgery. \par \par \par 1. Preop. cardiac risk eval. \par -revised cardiac risk index of 0\par -pending TTE and EST. \par \par 2. Morbid obesity\par -continue dieting and exercise\par \par \par \par Follow up as needed if the above tests are normal. \par \par Originally signed 7/12/22

## 2022-08-09 NOTE — HISTORY OF PRESENT ILLNESS
[FreeTextEntry1] : 37F h/o migraine and morbid obesity (BMI 45) planning for bariatric surgery with bypass/sleeve gastrectomy presents for preoperative cardiac risk evaluation. \par \par Had prior GYN diagnostic procedure few years ago under general anesthesia, no side effects, denies prior surgery. \par \par Denies cardiac complains, baseline active exercise with cardio in the gym for 30 minutes with elliptical and stationary bicycle. \par \par , , HDL 35, \par \par Father with strokes age 50s\par Nonsmoker, no alcohol use\par Completed COVID vaccine\par Works as  assistance for [employer]

## 2022-08-10 ENCOUNTER — TRANSCRIPTION ENCOUNTER (OUTPATIENT)
Age: 37
End: 2022-08-10

## 2022-08-11 LAB — SARS-COV-2 N GENE NPH QL NAA+PROBE: NOT DETECTED

## 2022-08-12 ENCOUNTER — APPOINTMENT (OUTPATIENT)
Dept: BARIATRICS/WEIGHT MGMT | Facility: CLINIC | Age: 37
End: 2022-08-12

## 2022-08-12 ENCOUNTER — APPOINTMENT (OUTPATIENT)
Dept: PULMONOLOGY | Facility: CLINIC | Age: 37
End: 2022-08-12

## 2022-08-12 VITALS — BODY MASS INDEX: 46.38 KG/M2 | WEIGHT: 252 LBS | HEIGHT: 62 IN

## 2022-08-12 VITALS — RESPIRATION RATE: 16 BRPM | OXYGEN SATURATION: 98 % | HEART RATE: 79 BPM

## 2022-08-12 PROCEDURE — 85018 HEMOGLOBIN: CPT | Mod: QW

## 2022-08-12 PROCEDURE — 99203 OFFICE O/P NEW LOW 30 MIN: CPT | Mod: 25

## 2022-08-12 PROCEDURE — 94727 GAS DIL/WSHOT DETER LNG VOL: CPT

## 2022-08-12 PROCEDURE — 94010 BREATHING CAPACITY TEST: CPT

## 2022-08-12 PROCEDURE — 94729 DIFFUSING CAPACITY: CPT

## 2022-08-12 NOTE — CONSULT LETTER
[Dear  ___] : Dear  [unfilled], [Consult Letter:] : I had the pleasure of evaluating your patient, [unfilled]. [Please see my note below.] : Please see my note below. [Consult Closing:] : Thank you very much for allowing me to participate in the care of this patient.  If you have any questions, please do not hesitate to contact me. [Sincerely,] : Sincerely, [DrMaddy  ___] : Dr. TURPIN

## 2022-08-18 ENCOUNTER — APPOINTMENT (OUTPATIENT)
Dept: GASTROENTEROLOGY | Facility: CLINIC | Age: 37
End: 2022-08-18

## 2022-08-18 VITALS
BODY MASS INDEX: 46.01 KG/M2 | RESPIRATION RATE: 14 BRPM | OXYGEN SATURATION: 98 % | WEIGHT: 250 LBS | HEART RATE: 80 BPM | HEIGHT: 62 IN | SYSTOLIC BLOOD PRESSURE: 124 MMHG | DIASTOLIC BLOOD PRESSURE: 82 MMHG

## 2022-08-18 DIAGNOSIS — K76.0 FATTY (CHANGE OF) LIVER, NOT ELSEWHERE CLASSIFIED: ICD-10-CM

## 2022-08-18 DIAGNOSIS — E66.9 OBESITY, UNSPECIFIED: ICD-10-CM

## 2022-08-18 PROCEDURE — 99203 OFFICE O/P NEW LOW 30 MIN: CPT

## 2022-08-18 RX ORDER — NAPROXEN 500 MG/1
500 TABLET ORAL
Qty: 28 | Refills: 0 | Status: DISCONTINUED | COMMUNITY
Start: 2021-12-01 | End: 2022-08-18

## 2022-08-18 NOTE — HISTORY OF PRESENT ILLNESS
[de-identified] : Patient is a 37 year old female, with PMH of morbid obesity, who presents for EGD consult prior to bariatric surgery. \par \par Recent labs showed Hgb 14.1. Pt overall feeling well and asymptomatic from GI perspective. She takes Topimax prn for headaches, no current NSAID use. No chronic heartburn, states she gets acid reflux rarely, usually on empty stomach. \par \par Recent UGI showed no hiatal hernia. Recent U/S abdomen showed hepatic steatosis. Recent labs showed normal LFTs.\par \par Patient denies any significant cardiac or pulmonary conditions. \par \par Patient denies dysphagia, abdominal pain, change in BMs, rectal bleeding, nausea, vomiting, or unexplained weight loss.\par

## 2022-08-18 NOTE — PHYSICAL EXAM
[General Appearance - Alert] : alert [General Appearance - In No Acute Distress] : in no acute distress [Sclera] : the sclera and conjunctiva were normal [PERRL With Normal Accommodation] : pupils were equal in size, round, and reactive to light [Extraocular Movements] : extraocular movements were intact [Outer Ear] : the ears and nose were normal in appearance [Neck Appearance] : the appearance of the neck was normal [Auscultation Breath Sounds / Voice Sounds] : lungs were clear to auscultation bilaterally [Heart Rate And Rhythm] : heart rate was normal and rhythm regular [Heart Sounds] : normal S1 and S2 [Heart Sounds Gallop] : no gallops [Murmurs] : no murmurs [Heart Sounds Pericardial Friction Rub] : no pericardial rub [Bowel Sounds] : normal bowel sounds [Abdomen Soft] : soft [Abdomen Tenderness] : non-tender [] : no hepato-splenomegaly [Abdomen Mass (___ Cm)] : no abdominal mass palpated [No Focal Deficits] : no focal deficits [Oriented To Time, Place, And Person] : oriented to person, place, and time [Impaired Insight] : insight and judgment were intact [Affect] : the affect was normal [FreeTextEntry1] : o

## 2022-08-18 NOTE — ASSESSMENT
[FreeTextEntry1] : Patient is a 37 year old female, with PMH of morbid obesity, who presents for EGD consult prior to bariatric surgery. \par \par EGD to be scheduled to r/o gastritis, esophagitis, Herrera's Esophagus, or PUD. Indication, risks and benefit of EGD discussed with patient in detail. All questions answered. Patient is medically optimized for EGD. EGD to be done at [facility] due to BMI >45. \par \par U/S showed fatty liver, LFTs normal. Hopeful improvement after bariatric surgery and weight loss \par

## 2022-08-22 ENCOUNTER — OUTPATIENT (OUTPATIENT)
Dept: OUTPATIENT SERVICES | Facility: HOSPITAL | Age: 37
LOS: 1 days | End: 2022-08-22
Payer: MEDICAID

## 2022-08-22 DIAGNOSIS — G47.33 OBSTRUCTIVE SLEEP APNEA (ADULT) (PEDIATRIC): ICD-10-CM

## 2022-08-22 PROCEDURE — 95806 SLEEP STUDY UNATT&RESP EFFT: CPT | Mod: 26

## 2022-08-22 PROCEDURE — 95806 SLEEP STUDY UNATT&RESP EFFT: CPT

## 2022-08-29 ENCOUNTER — OUTPATIENT (OUTPATIENT)
Dept: OUTPATIENT SERVICES | Facility: HOSPITAL | Age: 37
LOS: 1 days | End: 2022-08-29
Payer: MEDICAID

## 2022-08-29 ENCOUNTER — APPOINTMENT (OUTPATIENT)
Dept: GASTROENTEROLOGY | Facility: HOSPITAL | Age: 37
End: 2022-08-29

## 2022-08-29 ENCOUNTER — RESULT REVIEW (OUTPATIENT)
Age: 37
End: 2022-08-29

## 2022-08-29 ENCOUNTER — TRANSCRIPTION ENCOUNTER (OUTPATIENT)
Age: 37
End: 2022-08-29

## 2022-08-29 DIAGNOSIS — E66.9 OBESITY, UNSPECIFIED: ICD-10-CM

## 2022-08-29 LAB — SARS-COV-2 N GENE NPH QL NAA+PROBE: NOT DETECTED

## 2022-08-29 PROCEDURE — 88342 IMHCHEM/IMCYTCHM 1ST ANTB: CPT

## 2022-08-29 PROCEDURE — 43239 EGD BIOPSY SINGLE/MULTIPLE: CPT

## 2022-08-29 PROCEDURE — 88342 IMHCHEM/IMCYTCHM 1ST ANTB: CPT | Mod: 26

## 2022-08-29 PROCEDURE — 88305 TISSUE EXAM BY PATHOLOGIST: CPT | Mod: 26

## 2022-08-29 PROCEDURE — 88305 TISSUE EXAM BY PATHOLOGIST: CPT

## 2022-08-31 ENCOUNTER — APPOINTMENT (OUTPATIENT)
Dept: BARIATRICS/WEIGHT MGMT | Facility: CLINIC | Age: 37
End: 2022-08-31

## 2022-08-31 VITALS
HEART RATE: 79 BPM | WEIGHT: 248.13 LBS | TEMPERATURE: 97.5 F | HEIGHT: 63 IN | RESPIRATION RATE: 14 BRPM | DIASTOLIC BLOOD PRESSURE: 77 MMHG | OXYGEN SATURATION: 98 % | BODY MASS INDEX: 43.96 KG/M2 | SYSTOLIC BLOOD PRESSURE: 115 MMHG

## 2022-08-31 DIAGNOSIS — G44.89 OTHER HEADACHE SYNDROME: ICD-10-CM

## 2022-08-31 PROCEDURE — 99213 OFFICE O/P EST LOW 20 MIN: CPT

## 2022-08-31 RX ORDER — CYCLOBENZAPRINE HYDROCHLORIDE 10 MG/1
10 TABLET, FILM COATED ORAL
Qty: 10 | Refills: 0 | Status: COMPLETED | COMMUNITY
Start: 2022-08-27

## 2022-08-31 NOTE — ASSESSMENT
[FreeTextEntry1] : 37 year old woman with a hx of obesity for  most of her life kyrie with preg interested in wt loss. Suffers from migraines and is on topamax prn \par 1 Labs reviewed : only need to cont on the vit D , rest are dominic. \par 2 Nutritionist  eval done and to continue\par 3 exercise: goal 150 min cardio , 60 min of weights\par 4 increase the  topiramate daily to 50  mg \par 5 follow up with me \par 20 min \par

## 2022-08-31 NOTE — HISTORY OF PRESENT ILLNESS
[FreeTextEntry1] : here for follow up fot wt loss , started the topamax daily about monthly. Only 25 mg. Her appitite is less, feels mejia than she used to . Planning on VSG in the future. \par \par Breakfast: \par cornflakes with 2% milk banana\par or \par cherrios with berries  and walnuts\par \par snack \par +/- \par greek yogurt \par or fruit\par \par Lunch \par salad with veggies and turkey breast, no dressing \par \par snack \par none\par \par Dinner \par 6\par grilled chicken with brown rice  and tossed salad\par \par exercise: \par gym: 1-2x week for 90 min : cardio and wts \par \par ETOH none \par \par sleep \par 8 hours

## 2022-09-01 ENCOUNTER — TRANSCRIPTION ENCOUNTER (OUTPATIENT)
Age: 37
End: 2022-09-01

## 2022-09-01 LAB — SURGICAL PATHOLOGY STUDY: SIGNIFICANT CHANGE UP

## 2022-09-06 NOTE — HISTORY OF PRESENT ILLNESS
[Obstructive Sleep Apnea] : obstructive sleep apnea [Awakes Unrefreshed] : awakes unrefreshed [Awakes with Dry Mouth] : awakes with dry mouth [Recent  Weight Gain] : recent weight gain [Snoring] : snoring [TextBox_4] : 37 year old morbidly obese woman, 5'3.5" and 246.8 pounds (BMI 43). The patient presents requesting pulmonary and sleep medicine clearance for weight loss surgery. She has been more than 75 lb. overweight for the past 10 years and is currently at her greatest weight. \par \par The patient has tried numerous weight loss programs including self-directed and physician supervised diets. Patient has not taken weight loss medication due to known side effects. She has lost up to 10 pounds on more than one occasion.\par \par The patient denies diabetes, shortness of breath with exertion, weight bearing joint pain, and low back pain. She denies kidney, urinary tract disease, headaches, dizziness, seizure or neurological disorder. She denies untreated thyroid, adrenal, pituitary disease, depression or psychiatric disorder. The patient denies gallstones, ulcer or liver disease. She denies high blood pressure, high cholesterol, heart attack or stroke. She denies anemia, bleeding disorder, thrombosis, clotting disorder or easy bruisability. She denies peripheral edema and difficulty sleeping. She denies irregular menses, hirsutism, acne, infertility or stress urinary incontinence.\par \par denies reflux\par denies smoking. [ESS] : 8

## 2022-09-07 ENCOUNTER — APPOINTMENT (OUTPATIENT)
Dept: PULMONOLOGY | Facility: CLINIC | Age: 37
End: 2022-09-07

## 2022-09-07 ENCOUNTER — TRANSCRIPTION ENCOUNTER (OUTPATIENT)
Age: 37
End: 2022-09-07

## 2022-09-07 VITALS
OXYGEN SATURATION: 98 % | SYSTOLIC BLOOD PRESSURE: 118 MMHG | HEIGHT: 63 IN | BODY MASS INDEX: 43.94 KG/M2 | RESPIRATION RATE: 14 BRPM | DIASTOLIC BLOOD PRESSURE: 74 MMHG | HEART RATE: 75 BPM | WEIGHT: 248 LBS

## 2022-09-07 DIAGNOSIS — R06.00 DYSPNEA, UNSPECIFIED: ICD-10-CM

## 2022-09-07 DIAGNOSIS — Z01.818 ENCOUNTER FOR OTHER PREPROCEDURAL EXAMINATION: ICD-10-CM

## 2022-09-07 DIAGNOSIS — R06.83 SNORING: ICD-10-CM

## 2022-09-07 PROCEDURE — 99213 OFFICE O/P EST LOW 20 MIN: CPT

## 2022-09-07 NOTE — ASSESSMENT
[FreeTextEntry1] : Morbid Obesity\par HUNTLEY\par Normal lung function \par Snoring \par Mild ILDA - no Rx required\par No significant pulmonary or sleep medicine related contraindication to bariatric surgery under general anesthesia\par

## 2022-09-14 ENCOUNTER — APPOINTMENT (OUTPATIENT)
Dept: BARIATRICS | Facility: CLINIC | Age: 37
End: 2022-09-14

## 2022-09-14 VITALS
OXYGEN SATURATION: 98 % | DIASTOLIC BLOOD PRESSURE: 84 MMHG | BODY MASS INDEX: 44.72 KG/M2 | SYSTOLIC BLOOD PRESSURE: 118 MMHG | TEMPERATURE: 97.3 F | WEIGHT: 252.38 LBS | HEIGHT: 63 IN | RESPIRATION RATE: 14 BRPM | HEART RATE: 66 BPM

## 2022-09-14 PROCEDURE — 97802 MEDICAL NUTRITION INDIV IN: CPT

## 2022-10-07 ENCOUNTER — APPOINTMENT (OUTPATIENT)
Dept: OBGYN | Facility: CLINIC | Age: 37
End: 2022-10-07

## 2022-10-25 ENCOUNTER — APPOINTMENT (OUTPATIENT)
Dept: BARIATRICS | Facility: CLINIC | Age: 37
End: 2022-10-25

## 2022-10-25 VITALS
HEART RATE: 80 BPM | HEIGHT: 63 IN | BODY MASS INDEX: 44.34 KG/M2 | DIASTOLIC BLOOD PRESSURE: 92 MMHG | OXYGEN SATURATION: 98 % | RESPIRATION RATE: 16 BRPM | WEIGHT: 250.25 LBS | SYSTOLIC BLOOD PRESSURE: 131 MMHG | TEMPERATURE: 96 F

## 2022-10-25 PROCEDURE — 97803 MED NUTRITION INDIV SUBSEQ: CPT

## 2022-11-04 ENCOUNTER — APPOINTMENT (OUTPATIENT)
Dept: DERMATOLOGY | Facility: CLINIC | Age: 37
End: 2022-11-04

## 2022-11-15 ENCOUNTER — APPOINTMENT (OUTPATIENT)
Dept: BARIATRICS | Facility: CLINIC | Age: 37
End: 2022-11-15

## 2022-11-15 VITALS
WEIGHT: 251.13 LBS | BODY MASS INDEX: 44.5 KG/M2 | TEMPERATURE: 97 F | OXYGEN SATURATION: 98 % | RESPIRATION RATE: 14 BRPM | DIASTOLIC BLOOD PRESSURE: 91 MMHG | SYSTOLIC BLOOD PRESSURE: 131 MMHG | HEIGHT: 63 IN | HEART RATE: 77 BPM

## 2022-11-15 PROCEDURE — 97803 MED NUTRITION INDIV SUBSEQ: CPT

## 2022-12-13 ENCOUNTER — APPOINTMENT (OUTPATIENT)
Dept: BARIATRICS | Facility: CLINIC | Age: 37
End: 2022-12-13

## 2022-12-13 VITALS
HEART RATE: 61 BPM | TEMPERATURE: 97.6 F | WEIGHT: 250 LBS | DIASTOLIC BLOOD PRESSURE: 88 MMHG | RESPIRATION RATE: 14 BRPM | OXYGEN SATURATION: 96 % | SYSTOLIC BLOOD PRESSURE: 134 MMHG | HEIGHT: 63 IN | BODY MASS INDEX: 44.3 KG/M2

## 2022-12-13 DIAGNOSIS — Z00.00 ENCOUNTER FOR GENERAL ADULT MEDICAL EXAMINATION W/OUT ABNORMAL FINDINGS: ICD-10-CM

## 2022-12-13 PROCEDURE — 97803 MED NUTRITION INDIV SUBSEQ: CPT

## 2022-12-27 ENCOUNTER — APPOINTMENT (OUTPATIENT)
Dept: DERMATOLOGY | Facility: CLINIC | Age: 37
End: 2022-12-27

## 2022-12-28 ENCOUNTER — OUTPATIENT (OUTPATIENT)
Dept: OUTPATIENT SERVICES | Facility: HOSPITAL | Age: 37
LOS: 1 days | End: 2022-12-28
Payer: MEDICAID

## 2022-12-28 VITALS
OXYGEN SATURATION: 98 % | DIASTOLIC BLOOD PRESSURE: 70 MMHG | HEART RATE: 75 BPM | SYSTOLIC BLOOD PRESSURE: 120 MMHG | RESPIRATION RATE: 17 BRPM | WEIGHT: 251.33 LBS | TEMPERATURE: 98 F | HEIGHT: 63 IN

## 2022-12-28 DIAGNOSIS — Z01.818 ENCOUNTER FOR OTHER PREPROCEDURAL EXAMINATION: ICD-10-CM

## 2022-12-28 DIAGNOSIS — E66.9 OBESITY, UNSPECIFIED: ICD-10-CM

## 2022-12-28 DIAGNOSIS — E66.01 MORBID (SEVERE) OBESITY DUE TO EXCESS CALORIES: ICD-10-CM

## 2022-12-28 DIAGNOSIS — Z91.89 OTHER SPECIFIED PERSONAL RISK FACTORS, NOT ELSEWHERE CLASSIFIED: ICD-10-CM

## 2022-12-28 LAB
A1C WITH ESTIMATED AVERAGE GLUCOSE RESULT: 5 % — SIGNIFICANT CHANGE UP (ref 4–5.6)
ALBUMIN SERPL ELPH-MCNC: 4.2 G/DL — SIGNIFICANT CHANGE UP (ref 3.3–5.2)
ALP SERPL-CCNC: 101 U/L — SIGNIFICANT CHANGE UP (ref 40–120)
ALT FLD-CCNC: 17 U/L — SIGNIFICANT CHANGE UP
ANION GAP SERPL CALC-SCNC: 11 MMOL/L — SIGNIFICANT CHANGE UP (ref 5–17)
APTT BLD: 34.7 SEC — SIGNIFICANT CHANGE UP (ref 27.5–35.5)
AST SERPL-CCNC: 22 U/L — SIGNIFICANT CHANGE UP
BASOPHILS # BLD AUTO: 0.04 K/UL — SIGNIFICANT CHANGE UP (ref 0–0.2)
BASOPHILS NFR BLD AUTO: 0.6 % — SIGNIFICANT CHANGE UP (ref 0–2)
BILIRUB SERPL-MCNC: 0.4 MG/DL — SIGNIFICANT CHANGE UP (ref 0.4–2)
BLD GP AB SCN SERPL QL: SIGNIFICANT CHANGE UP
BUN SERPL-MCNC: 15.2 MG/DL — SIGNIFICANT CHANGE UP (ref 8–20)
CALCIUM SERPL-MCNC: 9.6 MG/DL — SIGNIFICANT CHANGE UP (ref 8.4–10.5)
CHLORIDE SERPL-SCNC: 103 MMOL/L — SIGNIFICANT CHANGE UP (ref 96–108)
CO2 SERPL-SCNC: 24 MMOL/L — SIGNIFICANT CHANGE UP (ref 22–29)
CREAT SERPL-MCNC: 0.44 MG/DL — LOW (ref 0.5–1.3)
EGFR: 128 ML/MIN/1.73M2 — SIGNIFICANT CHANGE UP
EOSINOPHIL # BLD AUTO: 0.22 K/UL — SIGNIFICANT CHANGE UP (ref 0–0.5)
EOSINOPHIL NFR BLD AUTO: 3.2 % — SIGNIFICANT CHANGE UP (ref 0–6)
ESTIMATED AVERAGE GLUCOSE: 97 MG/DL — SIGNIFICANT CHANGE UP (ref 68–114)
GLUCOSE SERPL-MCNC: 95 MG/DL — SIGNIFICANT CHANGE UP (ref 70–99)
HCG SERPL-ACNC: <4 MIU/ML — SIGNIFICANT CHANGE UP
HCT VFR BLD CALC: 41.8 % — SIGNIFICANT CHANGE UP (ref 34.5–45)
HGB BLD-MCNC: 13.8 G/DL — SIGNIFICANT CHANGE UP (ref 11.5–15.5)
IMM GRANULOCYTES NFR BLD AUTO: 0.1 % — SIGNIFICANT CHANGE UP (ref 0–0.9)
INR BLD: 1.09 RATIO — SIGNIFICANT CHANGE UP (ref 0.88–1.16)
LYMPHOCYTES # BLD AUTO: 2.13 K/UL — SIGNIFICANT CHANGE UP (ref 1–3.3)
LYMPHOCYTES # BLD AUTO: 30.5 % — SIGNIFICANT CHANGE UP (ref 13–44)
MAGNESIUM SERPL-MCNC: 1.8 MG/DL — SIGNIFICANT CHANGE UP (ref 1.6–2.6)
MCHC RBC-ENTMCNC: 28.8 PG — SIGNIFICANT CHANGE UP (ref 27–34)
MCHC RBC-ENTMCNC: 33 GM/DL — SIGNIFICANT CHANGE UP (ref 32–36)
MCV RBC AUTO: 87.3 FL — SIGNIFICANT CHANGE UP (ref 80–100)
MONOCYTES # BLD AUTO: 0.33 K/UL — SIGNIFICANT CHANGE UP (ref 0–0.9)
MONOCYTES NFR BLD AUTO: 4.7 % — SIGNIFICANT CHANGE UP (ref 2–14)
NEUTROPHILS # BLD AUTO: 4.25 K/UL — SIGNIFICANT CHANGE UP (ref 1.8–7.4)
NEUTROPHILS NFR BLD AUTO: 60.9 % — SIGNIFICANT CHANGE UP (ref 43–77)
PHOSPHATE SERPL-MCNC: 3.6 MG/DL — SIGNIFICANT CHANGE UP (ref 2.4–4.7)
PLATELET # BLD AUTO: 341 K/UL — SIGNIFICANT CHANGE UP (ref 150–400)
POTASSIUM SERPL-MCNC: 4.9 MMOL/L — SIGNIFICANT CHANGE UP (ref 3.5–5.3)
POTASSIUM SERPL-SCNC: 4.9 MMOL/L — SIGNIFICANT CHANGE UP (ref 3.5–5.3)
PROT SERPL-MCNC: 7.8 G/DL — SIGNIFICANT CHANGE UP (ref 6.6–8.7)
PROTHROM AB SERPL-ACNC: 12.7 SEC — SIGNIFICANT CHANGE UP (ref 10.5–13.4)
RBC # BLD: 4.79 M/UL — SIGNIFICANT CHANGE UP (ref 3.8–5.2)
RBC # FLD: 11.9 % — SIGNIFICANT CHANGE UP (ref 10.3–14.5)
SODIUM SERPL-SCNC: 138 MMOL/L — SIGNIFICANT CHANGE UP (ref 135–145)
WBC # BLD: 6.98 K/UL — SIGNIFICANT CHANGE UP (ref 3.8–10.5)
WBC # FLD AUTO: 6.98 K/UL — SIGNIFICANT CHANGE UP (ref 3.8–10.5)

## 2022-12-28 PROCEDURE — G0463: CPT

## 2022-12-28 RX ORDER — BUPIVACAINE 13.3 MG/ML
20 INJECTION, SUSPENSION, LIPOSOMAL INFILTRATION ONCE
Refills: 0 | Status: DISCONTINUED | OUTPATIENT
Start: 2023-01-10 | End: 2023-01-10

## 2022-12-28 RX ORDER — SODIUM CHLORIDE 9 MG/ML
3 INJECTION INTRAMUSCULAR; INTRAVENOUS; SUBCUTANEOUS EVERY 8 HOURS
Refills: 0 | Status: DISCONTINUED | OUTPATIENT
Start: 2023-01-10 | End: 2023-01-12

## 2022-12-28 NOTE — H&P PST ADULT - ASSESSMENT
Patient educated on surgical scrub, COVID testing, preadmission instructions, incentive spirometer, medical clearance and day of procedure medications, verbalizes understanding. Pt instructed to stop vitamins/supplements/herbal medications/ASA/NSAIDS for one week prior to surgery and discuss with PMD.    CAPRINI SCORE    AGE RELATED RISK FACTORS                                                             [ ] Age 41-60 years                                            (1 Point)  [ ] Age: 61-74 years                                           (2 Points)                 [ ] Age= 75 years                                                (3 Points)             DISEASE RELATED RISK FACTORS                                                       [ ] Edema in the lower extremities                 (1 Point)                     [ ] Varicose veins                                               (1 Point)                                 [ ] BMI > 25 Kg/m2                                            (1 Point)                                  [ ] Serious infection (ie PNA)                            (1 Point)                     [ ] Lung disease ( COPD, Emphysema)            (1 Point)                                                                          [ ] Acute myocardial infarction                         (1 Point)                  [ ] Congestive heart failure (in the previous month)  (1 Point)         [ ] Inflammatory bowel disease                            (1 Point)                  [ ] Central venous access, PICC or Port               (2 points)       (within the last month)                                                                [ ] Stroke (in the previous month)                        (5 Points)    [ ] Previous or present malignancy                       (2 points)                                                                                                                                                         HEMATOLOGY RELATED FACTORS                                                         [ ] Prior episodes of VTE                                     (3 Points)                     [ ] Positive family history for VTE                      (3 Points)                  [ ] Prothrombin 67100 A                                     (3 Points)                     [ ] Factor V Leiden                                                (3 Points)                        [ ] Lupus anticoagulants                                      (3 Points)                                                           [ ] Anticardiolipin antibodies                              (3 Points)                                                       [ ] High homocysteine in the blood                   (3 Points)                                             [ ] Other congenital or acquired thrombophilia      (3 Points)                                                [ ] Heparin induced thrombocytopenia                  (3 Points)                                        MOBILITY RELATED FACTORS  [ ] Bed rest                                                         (1 Point)  [ ] Plaster cast                                                    (2 points)  [ ] Bed bound for more than 72 hours           (2 Points)    GENDER SPECIFIC FACTORS  [ ] Pregnancy or had a baby within the last month   (1 Point)  [ ] Post-partum < 6 weeks                                   (1 Point)  [ ] Hormonal therapy  or oral contraception   (1 Point)  [ ] History of pregnancy complications              (1 point)  [ ] Unexplained or recurrent              (1 Point)    OTHER RISK FACTORS                                           (1 Point)  [ ] BMI >40, smoking, diabetes requiring insulin, chemotherapy  blood transfusions and length of surgery over 2 hours    SURGERY RELATED RISK FACTORS  [ ]  Section within the last month     (1 Point)  [ ] Minor surgery                                                  (1 Point)  [ ] Arthroscopic surgery                                       (2 Points)  [ ] Planned major surgery lasting more            (2 Points)      than 45 minutes     [ ] Elective hip or knee joint replacement       (5 points)       surgery                                                TRAUMA RELATED RISK FACTORS  [ ] Fracture of the hip, pelvis, or leg                       (5 Points)  [ ] Spinal cord injury resulting in paralysis             (5 points)       (in the previous month)    [ ] Paralysis  (less than 1 month)                             (5 Points)  [ ] Multiple Trauma within 1 month                        (5 Points)    Total Score [        ]    Caprini Score 0-2: Low Risk, NO VTE prophylaxis required for most patients, encourage ambulation  Caprini Score 3-6: Moderate Risk , pharmacologic VTE prophylaxis is indicated for most patients (in the absence of contraindications)  Caprini Score Greater than or =7: High risk, pharmocologic VTE prophylaxis indicated for most patients (in the absence of contraindications)    OPIOID RISK TOOL    MIRTHA EACH BOX THAT APPLIES AND ADD TOTALS AT THE END    FAMILY HISTORY OF SUBSTANCE ABUSE                 FEMALE         MALE                                                Alcohol                             [  ]1 pt          [  ]3pts                                               Illegal Durgs                     [  ]2 pts        [  ]3pts                                               Rx Drugs                           [  ]4 pts        [  ]4 pts    PERSONAL HISTORY OF SUBSTANCE ABUSE                                                                                          Alcohol                             [  ]3 pts       [  ]3 pts                                               Illegal Drugs                     [  ]4 pts        [  ]4 pts                                               Rx Drugs                           [  ]5 pts        [  ]5 pts    AGE BETWEEN 16-45 YEARS                                      [  ]1 pt         [  ]1 pt    HISTORY OF PREADOLESCENT   SEXUAL ABUSE                                                             [  ]3 pts        [  ]0pts    PSYCHOLOGICAL DISEASE                     ADD, OCD, Bipolar, Schizophrenia        [  ]2 pts         [  ]2 pts                      Depression                                               [  ]1 pt           [  ]1 pt           SCORING TOTAL   (add numbers and type here)              (***)                                     A score of 3 or lower indicated LOW risk for future opioid abuse  A score of 4 to 7 indicated moderate risk for future opioid abuse  A score of 8 or higher indicates a high risk for opioid abuse   38 y/o female with PMH of obesity presents to PST with complaints of not being able to lose weight. States she has tried multiple weight loss programs with no success in losing any weight.  Denies fevers, chills, nausea or vomiting. Patient is scheduled for robotic sleeve gastrectomy on 1/10/23 with Dr. Rodriguez. Patient educated on surgical scrub, COVID testing, preadmission instructions, incentive spirometer, medical clearance and day of procedure medications, verbalizes understanding. Pt instructed to stop vitamins/supplements/herbal medications/ASA/NSAIDS for one week prior to surgery and discuss with PMD.    CAPRINI SCORE    AGE RELATED RISK FACTORS                                                             [ ] Age 41-60 years                                            (1 Point)  [ ] Age: 61-74 years                                           (2 Points)                 [ ] Age= 75 years                                                (3 Points)             DISEASE RELATED RISK FACTORS                                                       [ ] Edema in the lower extremities                 (1 Point)                     [ ] Varicose veins                                               (1 Point)                                 [ x] BMI > 25 Kg/m2                                            (1 Point)                                  [ ] Serious infection (ie PNA)                            (1 Point)                     [ ] Lung disease ( COPD, Emphysema)            (1 Point)                                                                          [ ] Acute myocardial infarction                         (1 Point)                  [ ] Congestive heart failure (in the previous month)  (1 Point)         [ ] Inflammatory bowel disease                            (1 Point)                  [ ] Central venous access, PICC or Port               (2 points)       (within the last month)                                                                [ ] Stroke (in the previous month)                        (5 Points)    [ ] Previous or present malignancy                       (2 points)                                                                                                                                                         HEMATOLOGY RELATED FACTORS                                                         [ ] Prior episodes of VTE                                     (3 Points)                     [ ] Positive family history for VTE                      (3 Points)                  [ ] Prothrombin 04539 A                                     (3 Points)                     [ ] Factor V Leiden                                                (3 Points)                        [ ] Lupus anticoagulants                                      (3 Points)                                                           [ ] Anticardiolipin antibodies                              (3 Points)                                                       [ ] High homocysteine in the blood                   (3 Points)                                             [ ] Other congenital or acquired thrombophilia      (3 Points)                                                [ ] Heparin induced thrombocytopenia                  (3 Points)                                        MOBILITY RELATED FACTORS  [ ] Bed rest                                                         (1 Point)  [ ] Plaster cast                                                    (2 points)  [ ] Bed bound for more than 72 hours           (2 Points)    GENDER SPECIFIC FACTORS  [ ] Pregnancy or had a baby within the last month   (1 Point)  [ ] Post-partum < 6 weeks                                   (1 Point)  [ ] Hormonal therapy  or oral contraception   (1 Point)  [ ] History of pregnancy complications              (1 point)  [ ] Unexplained or recurrent              (1 Point)    OTHER RISK FACTORS                                           (1 Point)  [x ] BMI >40, smoking, diabetes requiring insulin, chemotherapy  blood transfusions and length of surgery over 2 hours    SURGERY RELATED RISK FACTORS  [ ]  Section within the last month     (1 Point)  [ ] Minor surgery                                                  (1 Point)  [ ] Arthroscopic surgery                                       (2 Points)  [x ] Planned major surgery lasting more            (2 Points)      than 45 minutes     [ ] Elective hip or knee joint replacement       (5 points)       surgery                                                TRAUMA RELATED RISK FACTORS  [ ] Fracture of the hip, pelvis, or leg                       (5 Points)  [ ] Spinal cord injury resulting in paralysis             (5 points)       (in the previous month)    [ ] Paralysis  (less than 1 month)                             (5 Points)  [ ] Multiple Trauma within 1 month                        (5 Points)    Total Score [    4    ]    Caprini Score 0-2: Low Risk, NO VTE prophylaxis required for most patients, encourage ambulation  Caprini Score 3-6: Moderate Risk , pharmacologic VTE prophylaxis is indicated for most patients (in the absence of contraindications)  Caprini Score Greater than or =7: High risk, pharmocologic VTE prophylaxis indicated for most patients (in the absence of contraindications)    OPIOID RISK TOOL    MIRTHA EACH BOX THAT APPLIES AND ADD TOTALS AT THE END    FAMILY HISTORY OF SUBSTANCE ABUSE                 FEMALE         MALE                                                Alcohol                             [  ]1 pt          [  ]3pts                                               Illegal Durgs                     [  ]2 pts        [  ]3pts                                               Rx Drugs                           [  ]4 pts        [  ]4 pts    PERSONAL HISTORY OF SUBSTANCE ABUSE                                                                                          Alcohol                             [  ]3 pts       [  ]3 pts                                               Illegal Drugs                     [  ]4 pts        [  ]4 pts                                               Rx Drugs                           [  ]5 pts        [  ]5 pts    AGE BETWEEN 16-45 YEARS                                      [ x ]1 pt         [  ]1 pt    HISTORY OF PREADOLESCENT   SEXUAL ABUSE                                                             [  ]3 pts        [  ]0pts    PSYCHOLOGICAL DISEASE                     ADD, OCD, Bipolar, Schizophrenia        [  ]2 pts         [  ]2 pts                      Depression                                               [  ]1 pt           [  ]1 pt           SCORING TOTAL   (add numbers and type here)              (***1)                                     A score of 3 or lower indicated LOW risk for future opioid abuse  A score of 4 to 7 indicated moderate risk for future opioid abuse  A score of 8 or higher indicates a high risk for opioid abuse

## 2022-12-28 NOTE — H&P PST ADULT - HISTORY OF PRESENT ILLNESS
36 y/o female with PMH of obesity presents to PST with complaints of 38 y/o female with PMH of obesity presents to [facility] with complaints of not being able to lose weight. States she has tried multiple weight loss programs with no success in losing any weight.  Denies fevers, chills, nausea or vomiting. Patient is scheduled for robotic sleeve gastrectomy on 1/10/23 with Dr. Rodriguez.

## 2022-12-28 NOTE — H&P PST ADULT - PROBLEM SELECTOR PLAN 1
medical clearance pending  Patient is scheduled for robotic sleeve gastrectomy on 1/10/23 with Dr. Rodriguez.

## 2022-12-28 NOTE — H&P PST ADULT - NSICDXFAMILYHX_GEN_ALL_CORE_FT
FAMILY HISTORY:  Family history not known due to adoption    Uncle  Still living? Unknown  FH: CAD (coronary artery disease), Age at diagnosis: Age Unknown

## 2022-12-28 NOTE — H&P PST ADULT - ATTENDING COMMENTS
Plan for robotic sleeve gastrectomy, possible hiatal hernia repair  Risks/benefits discussed with patient. She understands, agrees, and wishes to proceed. All questions answered.

## 2022-12-28 NOTE — H&P PST ADULT - NSANTHOSAYNRD_GEN_A_CORE
sleep study done, neg for ILDA/No. ILDA screening performed.  STOP BANG Legend: 0-2 = LOW Risk; 3-4 = INTERMEDIATE Risk; 5-8 = HIGH Risk

## 2022-12-28 NOTE — H&P PST ADULT - NSANTHSNORERD_ENT_A_CORE
2000: family at bedside, updated, answered all questions.      2300: rounds completed with Dr. Bay and Charge Rn, no new orders.       Pt A&O x4, still with visual hallucinations but aware that hallucinating, frequently reoriented. On 4 L NC, saturating well. NSR. OFF LEVO 3/3 1711, BP Monitored, stable during NOC. Moran still with bloody urine.     Will continue to monitor and assist.       Problem: Pain  Goal: #Acceptable pain level achieved/maintained at rest using NRS/Faces  Description: This goal is used for patients who can self-report.  Acceptable means the level is at or below the identified comfort/function goal.  Outcome: Outcome Not Met, Continue to Monitor  Goal: # Acceptable pain level achieved/maintained at rest using NRS/Faces without oversedation (opioid naive or PCA/Epidural infusion)  Description: This goal is used if Opioid-naïve or on PCA/Epidural Infusion.  Outcome: Outcome Not Met, Continue to Monitor  Goal: # Acceptable pain level achieved/maintained with activity using NRS/Faces  Description: This goal is used for patients who can self-report and are not achieving acceptable pain control during activity.  Outcome: Outcome Not Met, Continue to Monitor  Goal: Acceptable pain/comfort level is achieved/maintained at rest (based on Pain Behaviors Scale)  Description: This goal is used for patients who are not able to self-report pain and are assessed for pain using the Pain Behaviors Scale  Outcome: Outcome Not Met, Continue to Monitor  Goal: Acceptable pain/comfort level is achieved/maintained at rest based on PAINAID scale (Dementia)  Description: This goal is used for patients who are not able to self-report pain, have dementia, and assessed using the PAINAD scale.  Outcome: Outcome Not Met, Continue to Monitor  Goal: Acceptable pain/comfort level is achieved/maintained at rest (based on pediatric behavior tool: NIPS, NPASS, or FLACC)  Description: This goal is used for pediatric patients  who are not able to self report pain.  Outcome: Outcome Not Met, Continue to Monitor  Goal: # Verbalizes understanding of pain management  Description: Documented in Patient Education Activity  Outcome: Outcome Not Met, Continue to Monitor  Goal: Verbalizes understanding and effective use of Patient Controlled Analgesia (PCA)  Description: Documented in Patient Education Activity  This goal is used for patients with PCA  Outcome: Outcome Not Met, Continue to Monitor  Goal: Maximum comfort achieved/maintained at end of life (Hospice)  Outcome: Outcome Not Met, Continue to Monitor     Problem: At Risk for Falls  Goal: # Patient does not fall  Outcome: Outcome Not Met, Continue to Monitor  Goal: # Takes action to control fall-related risks  Outcome: Outcome Not Met, Continue to Monitor  Goal: # Verbalizes understanding of fall risk/precautions  Description: Document education using the patient education activity  Outcome: Outcome Not Met, Continue to Monitor     Problem: VTE, Risk for  Goal: # No s/s of VTE  Outcome: Outcome Not Met, Continue to Monitor  Goal: # Verbalizes understanding of VTE risk factors and prevention  Description: Document education using the patient education activity.   Outcome: Outcome Not Met, Continue to Monitor  Goal: Demonstrates ability to administer injectable anticoagulants if ordered for d/c  Description: Document education using the patient education activity.  Outcome: Outcome Not Met, Continue to Monitor     Problem: Urinary Tract Infection  Goal: Urinary Tract Infection (UTI) s/s resolved  Outcome: Outcome Not Met, Continue to Monitor  Goal: Verbalizes s/s of UTI and treatment plan  Description: Document on Patient Education Activity   Outcome: Outcome Not Met, Continue to Monitor     Problem: Activity Intolerance  Goal: # Functional status is maintained or returned to baseline  Outcome: Outcome Not Met, Continue to Monitor  Goal: # Tolerates activity for d/c setting with no clinical  problems  Outcome: Outcome Not Met, Continue to Monitor      Yes

## 2022-12-29 ENCOUNTER — APPOINTMENT (OUTPATIENT)
Dept: FAMILY MEDICINE | Facility: CLINIC | Age: 37
End: 2022-12-29
Payer: MEDICAID

## 2022-12-29 ENCOUNTER — APPOINTMENT (OUTPATIENT)
Dept: SURGERY | Facility: CLINIC | Age: 37
End: 2022-12-29

## 2022-12-29 ENCOUNTER — NON-APPOINTMENT (OUTPATIENT)
Age: 37
End: 2022-12-29

## 2022-12-29 VITALS
OXYGEN SATURATION: 98 % | HEART RATE: 82 BPM | DIASTOLIC BLOOD PRESSURE: 78 MMHG | SYSTOLIC BLOOD PRESSURE: 118 MMHG | BODY MASS INDEX: 43.77 KG/M2 | HEIGHT: 63 IN | WEIGHT: 247 LBS

## 2022-12-29 VITALS
TEMPERATURE: 97.3 F | HEIGHT: 63 IN | OXYGEN SATURATION: 96 % | DIASTOLIC BLOOD PRESSURE: 71 MMHG | HEART RATE: 87 BPM | SYSTOLIC BLOOD PRESSURE: 106 MMHG | RESPIRATION RATE: 16 BRPM | WEIGHT: 247.5 LBS | BODY MASS INDEX: 43.85 KG/M2

## 2022-12-29 DIAGNOSIS — Z87.59 PERSONAL HISTORY OF OTHER COMPLICATIONS OF PREGNANCY, CHILDBIRTH AND THE PUERPERIUM: ICD-10-CM

## 2022-12-29 DIAGNOSIS — E66.01 MORBID (SEVERE) OBESITY DUE TO EXCESS CALORIES: ICD-10-CM

## 2022-12-29 DIAGNOSIS — Z01.818 ENCOUNTER FOR OTHER PREPROCEDURAL EXAMINATION: ICD-10-CM

## 2022-12-29 LAB — HBA1C MFR BLD HPLC: 5

## 2022-12-29 PROCEDURE — 99214 OFFICE O/P EST MOD 30 MIN: CPT

## 2022-12-29 RX ORDER — ERGOCALCIFEROL 1.25 MG/1
1.25 MG CAPSULE, LIQUID FILLED ORAL
Qty: 8 | Refills: 0 | Status: DISCONTINUED | COMMUNITY
Start: 2021-10-19 | End: 2022-12-29

## 2022-12-29 RX ORDER — TOPIRAMATE 50 MG/1
50 TABLET, FILM COATED ORAL
Qty: 30 | Refills: 1 | Status: DISCONTINUED | COMMUNITY
Start: 2022-03-07 | End: 2022-12-29

## 2022-12-30 PROBLEM — E66.9 OBESITY, UNSPECIFIED: Chronic | Status: ACTIVE | Noted: 2022-12-28

## 2022-12-30 PROBLEM — Z87.59 HISTORY OF SPONTANEOUS ABORTION: Status: RESOLVED | Noted: 2020-12-29 | Resolved: 2022-12-30

## 2022-12-30 PROBLEM — Z01.818 PREOPERATIVE CLEARANCE: Status: ACTIVE | Noted: 2022-08-18

## 2022-12-30 NOTE — HISTORY OF PRESENT ILLNESS
[No Pertinent Cardiac History] : no history of aortic stenosis, atrial fibrillation, coronary artery disease, recent myocardial infarction, or implantable device/pacemaker [Sleep Apnea] : sleep apnea [No Adverse Anesthesia Reaction] : no adverse anesthesia reaction in self or family member [(Patient denies any chest pain, claudication, dyspnea on exertion, orthopnea, palpitations or syncope)] : Patient denies any chest pain, claudication, dyspnea on exertion, orthopnea, palpitations or syncope [Asthma] : no asthma [COPD] : no COPD [Chronic Anticoagulation] : no chronic anticoagulation [Chronic Kidney Disease] : no chronic kidney disease [Diabetes] : no diabetes [FreeTextEntry1] : GASTRIC SLEEVE [FreeTextEntry2] : JANUARY 10, 2023 [FreeTextEntry3] : DR. MEDLEY [FreeTextEntry4] : MS. ZAMUDIO IS A PLEASANT 36 YO PRESENTING FOR MEDICAL CLEARANCE FOR UPCOMING WEIGHT LOSS SURGERY.  FEELING WELL TODAY.  NO HISTORY OF MI, STROKE OR SEIZURE.  NO PERSONAL OR FAMILY HISTORY OF BLOOD OR CLOTTING DISORDERS.  DENIES EASY BLEEDING OR BRUISING.  IS ABLE TO WALK MULTIPLE BLOCKS AND GO UP MULTIPLE FLIGHTS OF STAIRS WITHOUT DIFFICULTY.   HAS HAD NO HEADACHE, DIZZINESS, CHEST PAIN, SHORTNESS OF BREATH, GI OR URINARY COMPLAINTS.  NO OTHER COMPLAINTS TODAY.  GOES TO THE GYM TWICE A WEEK.  DOES CARDIO- ELLIPTICAL AND RUNNING FOR 30 MINUTES.  WATCHING DIET AND PORTIONS.  MORE PROTEIN AND LESS CARBOHYDRATES.  HISTORY OF MORBID OBESITY AND HYPERCHOLESTEROLEMIA. [FreeTextEntry5] : MILD SLEEP APNEA - NO TREATMENT NEEDED [FreeTextEntry7] : HAD CLEARANCE WITH CARDIOLOGY\par CLEARANCE BY PULMONOLOGY

## 2022-12-30 NOTE — PLAN
[FreeTextEntry1] : OPTIMIZED MEDICALLY FOR PROPOSED SURGICAL PROCEDURE\par PRE-OPERATIVE TESTING REVIEWED\par SAW CARDIOLOGY AND PULMONOLOGY FOR CLEARANCE - APPRECIATED\par STRESS TEST REVIEWED\par GI/DVT PROPHYLAXIS PER SURGERY\par AVOIDANCE OF NSAIDS, VITAMINS AND ASPIRIN CONTAINING PRODUCTS PRIOR TO SURGERY\par CONTINUED HEALTHY DIET AND LIFESTYLE MODIFICATIONS; HEALTHY WEIGHT LOSS \par CALL WITH ANY QUESTIONS, CONCERNS OR CHANGES PRIOR TO PROCEDURE\par SUPPORT PROVIDED\par FOLLOW-UP POST-OPERATIVELY

## 2023-01-09 ENCOUNTER — TRANSCRIPTION ENCOUNTER (OUTPATIENT)
Age: 38
End: 2023-01-09

## 2023-01-09 LAB — SARS-COV-2 N GENE NPH QL NAA+PROBE: NOT DETECTED

## 2023-01-09 RX ORDER — OMEPRAZOLE 10 MG/1
1 CAPSULE, DELAYED RELEASE ORAL
Qty: 30 | Refills: 0
Start: 2023-01-09 | End: 2023-02-07

## 2023-01-09 RX ORDER — HYOSCYAMINE SULFATE 0.13 MG
0.12 TABLET ORAL
Qty: 56 | Refills: 0
Start: 2023-01-09

## 2023-01-09 RX ORDER — ACETAMINOPHEN 500 MG
30 TABLET ORAL
Qty: 150 | Refills: 0
Start: 2023-01-09

## 2023-01-09 RX ORDER — ONDANSETRON 8 MG/1
1 TABLET, FILM COATED ORAL
Qty: 56 | Refills: 0
Start: 2023-01-09

## 2023-01-09 NOTE — PATIENT PROFILE ADULT - FALL HARM RISK - HARM RISK INTERVENTIONS

## 2023-01-10 ENCOUNTER — INPATIENT (INPATIENT)
Facility: HOSPITAL | Age: 38
LOS: 1 days | Discharge: HOME | DRG: 621 | End: 2023-01-12
Attending: SURGERY | Admitting: SURGERY
Payer: MEDICAID

## 2023-01-10 ENCOUNTER — TRANSCRIPTION ENCOUNTER (OUTPATIENT)
Age: 38
End: 2023-01-10

## 2023-01-10 ENCOUNTER — APPOINTMENT (OUTPATIENT)
Dept: SURGERY | Facility: HOSPITAL | Age: 38
End: 2023-01-10

## 2023-01-10 ENCOUNTER — RESULT REVIEW (OUTPATIENT)
Age: 38
End: 2023-01-10

## 2023-01-10 VITALS
RESPIRATION RATE: 15 BRPM | OXYGEN SATURATION: 99 % | TEMPERATURE: 98 F | HEIGHT: 62.99 IN | SYSTOLIC BLOOD PRESSURE: 115 MMHG | WEIGHT: 242.95 LBS | DIASTOLIC BLOOD PRESSURE: 65 MMHG | HEART RATE: 65 BPM

## 2023-01-10 DIAGNOSIS — E66.01 MORBID (SEVERE) OBESITY DUE TO EXCESS CALORIES: ICD-10-CM

## 2023-01-10 LAB
ANION GAP SERPL CALC-SCNC: 15 MMOL/L — SIGNIFICANT CHANGE UP (ref 5–17)
BASOPHILS # BLD AUTO: 0.03 K/UL — SIGNIFICANT CHANGE UP (ref 0–0.2)
BASOPHILS NFR BLD AUTO: 0.3 % — SIGNIFICANT CHANGE UP (ref 0–2)
BUN SERPL-MCNC: 10.2 MG/DL — SIGNIFICANT CHANGE UP (ref 8–20)
CALCIUM SERPL-MCNC: 8.7 MG/DL — SIGNIFICANT CHANGE UP (ref 8.4–10.5)
CHLORIDE SERPL-SCNC: 102 MMOL/L — SIGNIFICANT CHANGE UP (ref 96–108)
CO2 SERPL-SCNC: 18 MMOL/L — LOW (ref 22–29)
CREAT SERPL-MCNC: 0.5 MG/DL — SIGNIFICANT CHANGE UP (ref 0.5–1.3)
EGFR: 125 ML/MIN/1.73M2 — SIGNIFICANT CHANGE UP
EOSINOPHIL # BLD AUTO: 0.02 K/UL — SIGNIFICANT CHANGE UP (ref 0–0.5)
EOSINOPHIL NFR BLD AUTO: 0.2 % — SIGNIFICANT CHANGE UP (ref 0–6)
GLUCOSE BLDC GLUCOMTR-MCNC: 85 MG/DL — SIGNIFICANT CHANGE UP (ref 70–99)
GLUCOSE SERPL-MCNC: 140 MG/DL — HIGH (ref 70–99)
HCT VFR BLD CALC: 41 % — SIGNIFICANT CHANGE UP (ref 34.5–45)
HGB BLD-MCNC: 13.7 G/DL — SIGNIFICANT CHANGE UP (ref 11.5–15.5)
IMM GRANULOCYTES NFR BLD AUTO: 0.5 % — SIGNIFICANT CHANGE UP (ref 0–0.9)
LYMPHOCYTES # BLD AUTO: 1.13 K/UL — SIGNIFICANT CHANGE UP (ref 1–3.3)
LYMPHOCYTES # BLD AUTO: 11.3 % — LOW (ref 13–44)
MCHC RBC-ENTMCNC: 29.5 PG — SIGNIFICANT CHANGE UP (ref 27–34)
MCHC RBC-ENTMCNC: 33.4 GM/DL — SIGNIFICANT CHANGE UP (ref 32–36)
MCV RBC AUTO: 88.2 FL — SIGNIFICANT CHANGE UP (ref 80–100)
MONOCYTES # BLD AUTO: 0.14 K/UL — SIGNIFICANT CHANGE UP (ref 0–0.9)
MONOCYTES NFR BLD AUTO: 1.4 % — LOW (ref 2–14)
NEUTROPHILS # BLD AUTO: 8.65 K/UL — HIGH (ref 1.8–7.4)
NEUTROPHILS NFR BLD AUTO: 86.3 % — HIGH (ref 43–77)
PLATELET # BLD AUTO: 230 K/UL — SIGNIFICANT CHANGE UP (ref 150–400)
POTASSIUM SERPL-MCNC: 4.2 MMOL/L — SIGNIFICANT CHANGE UP (ref 3.5–5.3)
POTASSIUM SERPL-SCNC: 4.2 MMOL/L — SIGNIFICANT CHANGE UP (ref 3.5–5.3)
RBC # BLD: 4.65 M/UL — SIGNIFICANT CHANGE UP (ref 3.8–5.2)
RBC # FLD: 12.3 % — SIGNIFICANT CHANGE UP (ref 10.3–14.5)
SODIUM SERPL-SCNC: 135 MMOL/L — SIGNIFICANT CHANGE UP (ref 135–145)
WBC # BLD: 10.02 K/UL — SIGNIFICANT CHANGE UP (ref 3.8–10.5)
WBC # FLD AUTO: 10.02 K/UL — SIGNIFICANT CHANGE UP (ref 3.8–10.5)

## 2023-01-10 PROCEDURE — S2900 ROBOTIC SURGICAL SYSTEM: CPT | Mod: NC

## 2023-01-10 PROCEDURE — 88342 IMHCHEM/IMCYTCHM 1ST ANTB: CPT | Mod: 26

## 2023-01-10 PROCEDURE — 43281 LAP PARAESOPHAG HERN REPAIR: CPT | Mod: 59

## 2023-01-10 PROCEDURE — 43775 LAP SLEEVE GASTRECTOMY: CPT

## 2023-01-10 PROCEDURE — 88307 TISSUE EXAM BY PATHOLOGIST: CPT | Mod: 26

## 2023-01-10 DEVICE — XI STAPLER SUREFORM RELOAD 60 GREEN: Type: IMPLANTABLE DEVICE | Status: FUNCTIONAL

## 2023-01-10 DEVICE — STAPLER SUREFORM 60 COMPATIBLE BLK: Type: IMPLANTABLE DEVICE | Status: FUNCTIONAL

## 2023-01-10 DEVICE — XI STAPLER SUREFORM RELOAD 60 BLACK: Type: IMPLANTABLE DEVICE | Status: FUNCTIONAL

## 2023-01-10 DEVICE — XI STAPLER SUREFORM RELOAD 60 BLUE: Type: IMPLANTABLE DEVICE | Status: FUNCTIONAL

## 2023-01-10 DEVICE — STAPLER GORE FOR SUREFORM 60 GREEN/BLUE: Type: IMPLANTABLE DEVICE | Status: FUNCTIONAL

## 2023-01-10 RX ORDER — SODIUM CHLORIDE 9 MG/ML
1000 INJECTION, SOLUTION INTRAVENOUS
Refills: 0 | Status: DISCONTINUED | OUTPATIENT
Start: 2023-01-10 | End: 2023-01-10

## 2023-01-10 RX ORDER — METOCLOPRAMIDE HCL 10 MG
10 TABLET ORAL EVERY 6 HOURS
Refills: 0 | Status: DISCONTINUED | OUTPATIENT
Start: 2023-01-10 | End: 2023-01-12

## 2023-01-10 RX ORDER — HEPARIN SODIUM 5000 [USP'U]/ML
5000 INJECTION INTRAVENOUS; SUBCUTANEOUS EVERY 8 HOURS
Refills: 0 | Status: DISCONTINUED | OUTPATIENT
Start: 2023-01-10 | End: 2023-01-12

## 2023-01-10 RX ORDER — PANTOPRAZOLE SODIUM 20 MG/1
40 TABLET, DELAYED RELEASE ORAL EVERY 24 HOURS
Refills: 0 | Status: DISCONTINUED | OUTPATIENT
Start: 2023-01-10 | End: 2023-01-12

## 2023-01-10 RX ORDER — FENTANYL CITRATE 50 UG/ML
25 INJECTION INTRAVENOUS
Refills: 0 | Status: DISCONTINUED | OUTPATIENT
Start: 2023-01-10 | End: 2023-01-10

## 2023-01-10 RX ORDER — ONDANSETRON 8 MG/1
4 TABLET, FILM COATED ORAL EVERY 6 HOURS
Refills: 0 | Status: DISCONTINUED | OUTPATIENT
Start: 2023-01-10 | End: 2023-01-12

## 2023-01-10 RX ORDER — SODIUM CHLORIDE 9 MG/ML
1000 INJECTION, SOLUTION INTRAVENOUS
Refills: 0 | Status: DISCONTINUED | OUTPATIENT
Start: 2023-01-10 | End: 2023-01-12

## 2023-01-10 RX ORDER — SODIUM CHLORIDE 9 MG/ML
1000 INJECTION, SOLUTION INTRAVENOUS
Refills: 0 | Status: DISCONTINUED | OUTPATIENT
Start: 2023-01-10 | End: 2023-01-11

## 2023-01-10 RX ORDER — ACETAMINOPHEN 500 MG
1000 TABLET ORAL ONCE
Refills: 0 | Status: COMPLETED | OUTPATIENT
Start: 2023-01-10 | End: 2023-01-10

## 2023-01-10 RX ORDER — ONDANSETRON 8 MG/1
4 TABLET, FILM COATED ORAL ONCE
Refills: 0 | Status: COMPLETED | OUTPATIENT
Start: 2023-01-10 | End: 2023-01-10

## 2023-01-10 RX ORDER — HEPARIN SODIUM 5000 [USP'U]/ML
5000 INJECTION INTRAVENOUS; SUBCUTANEOUS ONCE
Refills: 0 | Status: COMPLETED | OUTPATIENT
Start: 2023-01-10 | End: 2023-01-10

## 2023-01-10 RX ORDER — HYOSCYAMINE SULFATE 0.13 MG
0.12 TABLET ORAL EVERY 4 HOURS
Refills: 0 | Status: DISCONTINUED | OUTPATIENT
Start: 2023-01-10 | End: 2023-01-12

## 2023-01-10 RX ORDER — KETOROLAC TROMETHAMINE 30 MG/ML
15 SYRINGE (ML) INJECTION EVERY 6 HOURS
Refills: 0 | Status: DISCONTINUED | OUTPATIENT
Start: 2023-01-10 | End: 2023-01-12

## 2023-01-10 RX ORDER — CEFAZOLIN SODIUM 1 G
2000 VIAL (EA) INJECTION EVERY 8 HOURS
Refills: 0 | Status: COMPLETED | OUTPATIENT
Start: 2023-01-10 | End: 2023-01-11

## 2023-01-10 RX ORDER — CEFAZOLIN SODIUM 1 G
2000 VIAL (EA) INJECTION ONCE
Refills: 0 | Status: DISCONTINUED | OUTPATIENT
Start: 2023-01-10 | End: 2023-01-10

## 2023-01-10 RX ORDER — ACETAMINOPHEN 500 MG
975 TABLET ORAL EVERY 6 HOURS
Refills: 0 | Status: DISCONTINUED | OUTPATIENT
Start: 2023-01-11 | End: 2023-01-12

## 2023-01-10 RX ORDER — CEFAZOLIN SODIUM 1 G
2000 VIAL (EA) INJECTION EVERY 8 HOURS
Refills: 0 | Status: DISCONTINUED | OUTPATIENT
Start: 2023-01-10 | End: 2023-01-10

## 2023-01-10 RX ORDER — KETOROLAC TROMETHAMINE 30 MG/ML
15 SYRINGE (ML) INJECTION EVERY 6 HOURS
Refills: 0 | Status: DISCONTINUED | OUTPATIENT
Start: 2023-01-10 | End: 2023-01-11

## 2023-01-10 RX ADMIN — Medication 15 MILLIGRAM(S): at 23:47

## 2023-01-10 RX ADMIN — Medication 2000 MILLIGRAM(S): at 22:44

## 2023-01-10 RX ADMIN — HEPARIN SODIUM 5000 UNIT(S): 5000 INJECTION INTRAVENOUS; SUBCUTANEOUS at 12:02

## 2023-01-10 RX ADMIN — Medication 0.12 MILLIGRAM(S): at 19:51

## 2023-01-10 RX ADMIN — HEPARIN SODIUM 5000 UNIT(S): 5000 INJECTION INTRAVENOUS; SUBCUTANEOUS at 22:49

## 2023-01-10 RX ADMIN — Medication 10 MILLIGRAM(S): at 22:44

## 2023-01-10 RX ADMIN — SODIUM CHLORIDE 250 MILLILITER(S): 9 INJECTION, SOLUTION INTRAVENOUS at 18:02

## 2023-01-10 RX ADMIN — ONDANSETRON 4 MILLIGRAM(S): 8 TABLET, FILM COATED ORAL at 19:02

## 2023-01-10 RX ADMIN — ONDANSETRON 4 MILLIGRAM(S): 8 TABLET, FILM COATED ORAL at 23:46

## 2023-01-10 RX ADMIN — Medication 400 MILLIGRAM(S): at 23:43

## 2023-01-10 RX ADMIN — SODIUM CHLORIDE 3 MILLILITER(S): 9 INJECTION INTRAMUSCULAR; INTRAVENOUS; SUBCUTANEOUS at 21:17

## 2023-01-10 RX ADMIN — PANTOPRAZOLE SODIUM 40 MILLIGRAM(S): 20 TABLET, DELAYED RELEASE ORAL at 17:49

## 2023-01-10 RX ADMIN — SODIUM CHLORIDE 75 MILLILITER(S): 9 INJECTION, SOLUTION INTRAVENOUS at 17:49

## 2023-01-10 RX ADMIN — SODIUM CHLORIDE 125 MILLILITER(S): 9 INJECTION, SOLUTION INTRAVENOUS at 22:03

## 2023-01-10 NOTE — BRIEF OPERATIVE NOTE - NSICDXBRIEFPOSTOP_GEN_ALL_CORE_FT
POST-OP DIAGNOSIS:  Morbid obesity 10-Victoriano-2023 17:53:05  Ferdinand Martino  Hiatal hernia 10-Victoriano-2023 17:53:21  Ferdinand Martino

## 2023-01-10 NOTE — BRIEF OPERATIVE NOTE - OPERATION/FINDINGS
Veress insufflation. Initial port via Optiview, others placed under direct visualization. Liver retractor placed. Hiatal hernia noted- repaired. Sleeve performed with 36Fr Bougie in place, reinforced edges. Careful attention to hemostasis, light irrigation. Largest port site fascia closed. Skin closed then glue. All counts correct. See op note for details.

## 2023-01-10 NOTE — BRIEF OPERATIVE NOTE - NSICDXBRIEFPROCEDURE_GEN_ALL_CORE_FT
PROCEDURES:  Robot-assisted sleeve gastrectomy 10-Victoriano-2023 17:52:22  Ferdinand Martino  Robot-assisted repair of sliding hiatal hernia 10-Victoriano-2023 17:52:43  Ferdinand Martino

## 2023-01-10 NOTE — ASU PREOP CHECKLIST - BP NONINVASIVE DIASTOLIC (MM HG)
65
For information on Fall & Injury Prevention, visit [email or web address]

## 2023-01-11 ENCOUNTER — TRANSCRIPTION ENCOUNTER (OUTPATIENT)
Age: 38
End: 2023-01-11

## 2023-01-11 LAB
BASOPHILS # BLD AUTO: 0.01 K/UL — SIGNIFICANT CHANGE UP (ref 0–0.2)
BASOPHILS NFR BLD AUTO: 0.1 % — SIGNIFICANT CHANGE UP (ref 0–2)
EOSINOPHIL # BLD AUTO: 0 K/UL — SIGNIFICANT CHANGE UP (ref 0–0.5)
EOSINOPHIL NFR BLD AUTO: 0 % — SIGNIFICANT CHANGE UP (ref 0–6)
HCT VFR BLD CALC: 39.7 % — SIGNIFICANT CHANGE UP (ref 34.5–45)
HGB BLD-MCNC: 13.5 G/DL — SIGNIFICANT CHANGE UP (ref 11.5–15.5)
IMM GRANULOCYTES NFR BLD AUTO: 0.3 % — SIGNIFICANT CHANGE UP (ref 0–0.9)
LYMPHOCYTES # BLD AUTO: 1.53 K/UL — SIGNIFICANT CHANGE UP (ref 1–3.3)
LYMPHOCYTES # BLD AUTO: 14.5 % — SIGNIFICANT CHANGE UP (ref 13–44)
MCHC RBC-ENTMCNC: 29.3 PG — SIGNIFICANT CHANGE UP (ref 27–34)
MCHC RBC-ENTMCNC: 34 GM/DL — SIGNIFICANT CHANGE UP (ref 32–36)
MCV RBC AUTO: 86.3 FL — SIGNIFICANT CHANGE UP (ref 80–100)
MONOCYTES # BLD AUTO: 0.61 K/UL — SIGNIFICANT CHANGE UP (ref 0–0.9)
MONOCYTES NFR BLD AUTO: 5.8 % — SIGNIFICANT CHANGE UP (ref 2–14)
NEUTROPHILS # BLD AUTO: 8.39 K/UL — HIGH (ref 1.8–7.4)
NEUTROPHILS NFR BLD AUTO: 79.3 % — HIGH (ref 43–77)
PLATELET # BLD AUTO: 349 K/UL — SIGNIFICANT CHANGE UP (ref 150–400)
RBC # BLD: 4.6 M/UL — SIGNIFICANT CHANGE UP (ref 3.8–5.2)
RBC # FLD: 12 % — SIGNIFICANT CHANGE UP (ref 10.3–14.5)
WBC # BLD: 10.57 K/UL — HIGH (ref 3.8–10.5)
WBC # FLD AUTO: 10.57 K/UL — HIGH (ref 3.8–10.5)

## 2023-01-11 RX ORDER — HYOSCYAMINE SULFATE 0.13 MG
0.12 TABLET ORAL ONCE
Refills: 0 | Status: COMPLETED | OUTPATIENT
Start: 2023-01-11 | End: 2023-01-11

## 2023-01-11 RX ORDER — INFLUENZA VIRUS VACCINE 15; 15; 15; 15 UG/.5ML; UG/.5ML; UG/.5ML; UG/.5ML
0.5 SUSPENSION INTRAMUSCULAR ONCE
Refills: 0 | Status: DISCONTINUED | OUTPATIENT
Start: 2023-01-11 | End: 2023-01-12

## 2023-01-11 RX ORDER — APREPITANT 80 MG/1
40 CAPSULE ORAL ONCE
Refills: 0 | Status: DISCONTINUED | OUTPATIENT
Start: 2023-01-11 | End: 2023-01-12

## 2023-01-11 RX ORDER — ACETAMINOPHEN 500 MG
1000 TABLET ORAL ONCE
Refills: 0 | Status: COMPLETED | OUTPATIENT
Start: 2023-01-11 | End: 2023-01-11

## 2023-01-11 RX ORDER — DEXAMETHASONE 0.5 MG/5ML
4 ELIXIR ORAL ONCE
Refills: 0 | Status: COMPLETED | OUTPATIENT
Start: 2023-01-11 | End: 2023-01-11

## 2023-01-11 RX ADMIN — HEPARIN SODIUM 5000 UNIT(S): 5000 INJECTION INTRAVENOUS; SUBCUTANEOUS at 05:27

## 2023-01-11 RX ADMIN — Medication 1000 MILLIGRAM(S): at 00:15

## 2023-01-11 RX ADMIN — SODIUM CHLORIDE 3 MILLILITER(S): 9 INJECTION INTRAMUSCULAR; INTRAVENOUS; SUBCUTANEOUS at 13:17

## 2023-01-11 RX ADMIN — PANTOPRAZOLE SODIUM 40 MILLIGRAM(S): 20 TABLET, DELAYED RELEASE ORAL at 17:06

## 2023-01-11 RX ADMIN — SODIUM CHLORIDE 3 MILLILITER(S): 9 INJECTION INTRAMUSCULAR; INTRAVENOUS; SUBCUTANEOUS at 05:16

## 2023-01-11 RX ADMIN — SODIUM CHLORIDE 125 MILLILITER(S): 9 INJECTION, SOLUTION INTRAVENOUS at 05:32

## 2023-01-11 RX ADMIN — HEPARIN SODIUM 5000 UNIT(S): 5000 INJECTION INTRAVENOUS; SUBCUTANEOUS at 13:20

## 2023-01-11 RX ADMIN — SODIUM CHLORIDE 3 MILLILITER(S): 9 INJECTION INTRAMUSCULAR; INTRAVENOUS; SUBCUTANEOUS at 22:46

## 2023-01-11 RX ADMIN — HEPARIN SODIUM 5000 UNIT(S): 5000 INJECTION INTRAVENOUS; SUBCUTANEOUS at 23:19

## 2023-01-11 RX ADMIN — Medication 15 MILLIGRAM(S): at 17:06

## 2023-01-11 RX ADMIN — ONDANSETRON 4 MILLIGRAM(S): 8 TABLET, FILM COATED ORAL at 09:09

## 2023-01-11 RX ADMIN — Medication 400 MILLIGRAM(S): at 11:14

## 2023-01-11 RX ADMIN — Medication 15 MILLIGRAM(S): at 05:50

## 2023-01-11 RX ADMIN — ONDANSETRON 4 MILLIGRAM(S): 8 TABLET, FILM COATED ORAL at 05:21

## 2023-01-11 RX ADMIN — Medication 15 MILLIGRAM(S): at 18:32

## 2023-01-11 RX ADMIN — Medication 4 MILLIGRAM(S): at 17:07

## 2023-01-11 RX ADMIN — Medication 2000 MILLIGRAM(S): at 05:24

## 2023-01-11 RX ADMIN — Medication 15 MILLIGRAM(S): at 12:17

## 2023-01-11 RX ADMIN — Medication 15 MILLIGRAM(S): at 00:15

## 2023-01-11 RX ADMIN — ONDANSETRON 4 MILLIGRAM(S): 8 TABLET, FILM COATED ORAL at 23:20

## 2023-01-11 RX ADMIN — Medication 10 MILLIGRAM(S): at 04:54

## 2023-01-11 RX ADMIN — Medication 15 MILLIGRAM(S): at 05:22

## 2023-01-11 RX ADMIN — Medication 0.12 MILLIGRAM(S): at 17:06

## 2023-01-11 RX ADMIN — Medication 10 MILLIGRAM(S): at 13:53

## 2023-01-11 RX ADMIN — ONDANSETRON 4 MILLIGRAM(S): 8 TABLET, FILM COATED ORAL at 17:07

## 2023-01-11 RX ADMIN — Medication 15 MILLIGRAM(S): at 11:13

## 2023-01-11 RX ADMIN — Medication 1000 MILLIGRAM(S): at 12:55

## 2023-01-11 NOTE — CHART NOTE - NSCHARTNOTEFT_GEN_A_CORE
Bariatric Nutrition Note:    Diet: Diet, Clear Liquid:   Bariatric Clear Liquid (BARICLLIQ)     Special Instructions for Nursing:  Bariatric Clear Liquid,  start 6 hours postop if no nausea vomiting (01-10-23 @ 17:42)    PO intake/tolerance: Pt tolerating small sips of clear liquids, states she feels nauseous at this time.     Education: Provided pt with verbal/written literature on bariatric clear liquid diet (POD 1-2) and bariatric full liquid diet (POD 3-14). Pt demonstrates good understanding. Pt to follow up with outpatient RD after discharge.

## 2023-01-11 NOTE — DISCHARGE NOTE PROVIDER - HOSPITAL COURSE
On 1/10/23 Rowan Mcclain who has a history of morbid obesity BMI 43.1, underwent Robotic Assisted Sleeve Gastrectomy and repair of hiatal hernia. Bariatric ERAS protocol followed to include preoperative and perioperative use of DVT and SSI prophylaxis as well as multi-modal non-opioid analgesia. Patient tolerated the procedure well extubated in the operating room then transferred to the PACU in stable condition. Once hemodynamically stable and effective pain control the patient was able to ambulate with assistance. Pt was also able to void within 4 hours following the procedure. The patient was later transferred to a bariatric unit and placed on bedside continuous pulse oximetry. Pain management included IV multi-modal non-opioid analgesia then transitioned to liquid as needed. The patient tolerated bariatric clear liquid the evening of surgery.    On POD #1 patient remained stable with no acute events overnight, has effective pain control. Denies nausea and vomiting. Patient is ambulating independently and voiding as expected. The rest of the hospital course was uneventful, patient met 8-Point Bariatric Surgery D/C criteria and subsequently cleared for discharge home on POD#1. No extended VTE prophylaxis is necessary ([facility] VTE Risk Stratification Risk <1%). The patient will follow a protocol-derived staged meal progression supervised by the dietitian in the outpatient setting. Patient will follow-up with Dr. Rodriguez in 10-14 days, medical doctor and dietitian in 30 days. [facility] from [facility] prior to discharge. Written discharge instruction explained and given to include VTE prevention. On 1/10/23 Rowan Mcclain who has a history of morbid obesity BMI 43.1, underwent Robotic Assisted Sleeve Gastrectomy and repair of hiatal hernia. Bariatric ERAS protocol followed to include preoperative and perioperative use of DVT and SSI prophylaxis as well as multi-modal non-opioid analgesia. Patient tolerated the procedure well extubated in the operating room then transferred to the PACU in stable condition. Once hemodynamically stable and effective pain control the patient was able to ambulate with assistance. Pt was also able to void within 4 hours following the procedure. The patient was later transferred to a bariatric unit and placed on bedside continuous pulse oximetry. Pain management included IV multi-modal non-opioid analgesia then transitioned to liquid as needed. The patient tolerated bariatric clear liquid the evening of surgery.    On POD #1 patient remained stable with no acute events overnight, has effective pain control. However, had nausea and poor PO intake. By POD #2, patient improved significantly with greater PO intake and denied nausea and vomiting. Patient is ambulating independently and voiding as expected. The rest of the hospital course was uneventful, patient met 8-Point Bariatric Surgery D/C criteria and subsequently cleared for discharge home on POD #2. No extended VTE prophylaxis is necessary ([facility] VTE Risk Stratification Risk <1%). The patient will follow a protocol-derived staged meal progression supervised by the dietitian in the outpatient setting. Patient will follow-up with Dr. Rodriguez in 10-14 days, medical doctor and dietitian in 30 days. All appropriate [facility] obtained from [facility] prior to discharge. Written discharge instruction explained and given to include VTE prevention.

## 2023-01-11 NOTE — PROGRESS NOTE ADULT - ATTENDING COMMENTS
POD1, pain well controlled, not yet tolerating sufficient NPO fluids, pt reports nausea which is improving  Abdomen soft, nontender  Plan for discharge home when discharge criteria are met

## 2023-01-11 NOTE — DISCHARGE NOTE PROVIDER - NSDCCPCAREPLAN_GEN_ALL_CORE_FT
PRINCIPAL DISCHARGE DIAGNOSIS  Diagnosis: Morbid obesity  Assessment and Plan of Treatment: BATHING: Please do not submerge wound underwater. You may shower starting post op day #2. Remove outer clean dressings on post op day #5. If you have steri strips leave them in place until they fall off on their own.   ACTIVITY: No heavy lifting or straining. Otherwise, you may return to your usual level of physical activity. If you are taking narcotic pain medication (such as Percocet) DO NOT drive a car, operate machinery or make important decisions.  DIET: Please follow Bariatric diet protocol. You may begin your full liquids when returning home. Encourage protein filled liquids.     RETURN TO THE EMERGENCY DEPARTMENT for any of the following - worsening pain, fever/chills, nausea/vomiting, altered mental status, chest pain, shortness of breath, or any other new / worsening symptom. to your usual diet.  NOTIFY YOUR SURGEON IF: You have any bleeding that does not stop, any pus draining from your wound(s), any fever (over 100.4 F) or chills, persistent nausea/vomiting, persistent diarrhea, or if your pain is not controlled on your discharge medications.  FOLLOW-UP: Please follow up with your primary care physician within 3-4weeks after surgery and your bariatric surgeon as discussed. also ensure follow up and continued communication with your dietary team and other support services.

## 2023-01-11 NOTE — DISCHARGE NOTE PROVIDER - NSDCMRMEDTOKEN_GEN_ALL_CORE_FT
acetaminophen 160 mg/5 mL oral suspension: 30 milliliter(s) orally once a day   hyoscyamine 0.125 mg sublingual tablet: 0.125 tab(s) sublingually every 6 hours, As Needed for gastric spasm  omeprazole 40 mg oral delayed release capsule: 1 opened cap(s) orally once a day   ondansetron 4 mg oral tablet, disintegratin tab(s) orally every 6 hours, As Needed -for nausea

## 2023-01-11 NOTE — DISCHARGE NOTE PROVIDER - CARE PROVIDER_API CALL
John Rodriguez)  Surgery  Bariatric  [address] [place] [id number]  Phone: [phone]  Fax: [phone]  Follow Up Time:

## 2023-01-11 NOTE — DISCHARGE NOTE PROVIDER - NSDCFUSCHEDAPPT_GEN_ALL_CORE_FT
[facility] [address]  Scheduled Appointment: 01/26/2023    [facility] [address]  Scheduled Appointment: 03/02/2023

## 2023-01-12 ENCOUNTER — TRANSCRIPTION ENCOUNTER (OUTPATIENT)
Age: 38
End: 2023-01-12

## 2023-01-12 VITALS
HEART RATE: 70 BPM | DIASTOLIC BLOOD PRESSURE: 80 MMHG | SYSTOLIC BLOOD PRESSURE: 138 MMHG | TEMPERATURE: 99 F | OXYGEN SATURATION: 95 % | RESPIRATION RATE: 18 BRPM

## 2023-01-12 PROCEDURE — 88342 IMHCHEM/IMCYTCHM 1ST ANTB: CPT

## 2023-01-12 PROCEDURE — 88307 TISSUE EXAM BY PATHOLOGIST: CPT

## 2023-01-12 PROCEDURE — 85025 COMPLETE CBC W/AUTO DIFF WBC: CPT

## 2023-01-12 PROCEDURE — 80048 BASIC METABOLIC PNL TOTAL CA: CPT

## 2023-01-12 PROCEDURE — 36415 COLL VENOUS BLD VENIPUNCTURE: CPT

## 2023-01-12 PROCEDURE — S2900: CPT

## 2023-01-12 PROCEDURE — 82962 GLUCOSE BLOOD TEST: CPT

## 2023-01-12 PROCEDURE — C1889: CPT

## 2023-01-12 RX ADMIN — SODIUM CHLORIDE 3 MILLILITER(S): 9 INJECTION INTRAMUSCULAR; INTRAVENOUS; SUBCUTANEOUS at 05:16

## 2023-01-12 RX ADMIN — ONDANSETRON 4 MILLIGRAM(S): 8 TABLET, FILM COATED ORAL at 05:57

## 2023-01-12 RX ADMIN — ONDANSETRON 4 MILLIGRAM(S): 8 TABLET, FILM COATED ORAL at 12:00

## 2023-01-12 RX ADMIN — Medication 975 MILLIGRAM(S): at 07:34

## 2023-01-12 RX ADMIN — Medication 15 MILLIGRAM(S): at 04:15

## 2023-01-12 RX ADMIN — Medication 15 MILLIGRAM(S): at 04:01

## 2023-01-12 RX ADMIN — HEPARIN SODIUM 5000 UNIT(S): 5000 INJECTION INTRAVENOUS; SUBCUTANEOUS at 05:57

## 2023-01-12 RX ADMIN — Medication 975 MILLIGRAM(S): at 05:57

## 2023-01-12 RX ADMIN — Medication 975 MILLIGRAM(S): at 12:00

## 2023-01-12 NOTE — PROGRESS NOTE ADULT - ASSESSMENT
38yo F who is s/p sleeve gastrectomy and hiatal hernia repair./ Post-operatively with pain controlled, and nausea that is responsive to medication. Slow PO intake, encouraged to continue as tolerated and ambulate. HD stable and Hgb similar to pre-op.    - Continue Jose 1 Diet  - Decrease IVF as better able to tolerate PO  - OOB and ambulate  - Anti-emetics and pain meds per Bariatric Protocol  - AM CBC  - DVT ppx   - nursing was informed of need to have strict I's and O's for patient. 
36yo F who is s/p sleeve gastrectomy and hiatal hernia repair./ Post-operatively with pain controlled, and nausea that is responsive to medication. Slow PO intake, encouraged to continue as tolerated and ambulate. HD stable and Hgb similar to pre-op.    - Continue Jose 1 Diet  - Decrease IVF as better able to tolerate PO  - OOB and ambulate  - Anti-emetics and pain meds per Bariatric Protocol  - AM CBC  - DVT ppx

## 2023-01-12 NOTE — DISCHARGE NOTE NURSING/CASE MANAGEMENT/SOCIAL WORK - PATIENT PORTAL LINK FT
You can access the FollowMyHealth Patient Portal offered by [facility] by registering at the following website: [email or web address]. By joining [employer]’s FollowMyHealth portal, you will also be able to view your health information using other applications (apps) compatible with our system.

## 2023-01-12 NOTE — PROGRESS NOTE ADULT - SUBJECTIVE AND OBJECTIVE BOX
PROGRESS NOTE / POST OP CHECK    Patient is Post-op from robotic sleeve gastrectomy and hiatal hernia repair, seen and examined at bedside where she was found resting. [place] and responding appropriately, reports some abdominal pain that improved with medication. No nausea at time of evaluation, though reports sensation of "something coming up". Per RN, patient attempted PO intake later in evening but spit it back up without excessive retching. Antiemetics given per protocol and nausea improved afterwards. Advised to continue trying PO intake, but taking is slow. Has not been OOB, encouraged to ambulate.        MEDICATIONS  (STANDING):  acetaminophen    Suspension .. 975 milliGRAM(s) Oral every 6 hours  ceFAZolin  Injectable. 2000 milliGRAM(s) IV Push every 8 hours  heparin   Injectable 5000 Unit(s) SubCutaneous every 8 hours  influenza   Vaccine 0.5 milliLiter(s) IntraMuscular once  ketorolac   Injectable 15 milliGRAM(s) IV Push every 6 hours  lactated ringers. 1000 milliLiter(s) (125 mL/Hr) IV Continuous <Continuous>  ondansetron Injectable 4 milliGRAM(s) IV Push every 6 hours  pantoprazole  Injectable 40 milliGRAM(s) IV Push every 24 hours  sodium chloride 0.9% 1000 milliLiter(s) (250 mL/Hr) IV Continuous <Continuous>  sodium chloride 0.9% lock flush 3 milliLiter(s) IV Push every 8 hours    MEDICATIONS  (PRN):  hyoscyamine SL 0.125 milliGRAM(s) SubLingual every 4 hours PRN Gastric Spasms  ketorolac   Injectable 15 milliGRAM(s) IV Push every 6 hours PRN Incisional pain  LORazepam   Injectable 0.5 milliGRAM(s) IV Push once PRN Esophageal Spasm  metoclopramide Injectable 10 milliGRAM(s) IV Push every 6 hours PRN nausea      Vital Signs Last 24 Hrs  T(C): 36.8 (10 Victoriano 2023 21:20), Max: 36.8 (10 Victoriano 2023 11:37)  T(F): 98.2 (10 Victoriano 2023 21:20), Max: 98.2 (10 Victoriano 2023 11:37)  HR: 82 (10 Victoriano 2023 21:20) (65 - 83)  BP: 149/97 (10 Victoriano 2023 21:20) (113/72 - 149/97)  BP(mean): 95 (10 Victoriano 2023 19:15) (69 - 95)  RR: 16 (10 Victoriano 2023 21:20) (15 - 22)  SpO2: 96% (10 Victoriano 2023 21:20) (96% - 100%)    Parameters below as of 10 Victoriano 2023 19:40  Patient On (Oxygen Delivery Method): room air        Constitutional: patient resting comfortably in bed, in no acute distress  HEENT: EOMI, no active drainage or redness  Neck: Full ROM without pain  Respiratory: respirations are unlabored, no accessory muscle use, no conversational dyspnea  Cardiovascular: regular rate & rhythm  Gastrointestinal: Abdomen soft, minimally tender, non-distended. Incisions with glue, clean without bleeding or drainage.    Neurological: A&O x 3; no gross sensory / motor / coordination deficits  Musculoskeletal: No limitation of movement      I&O's Detail    10 Victoriano 2023 07:01  -  11 Jan 2023 00:06  --------------------------------------------------------  IN:  Total IN: 0 mL    OUT:    Voided (mL): 1600 mL  Total OUT: 1600 mL    Total NET: -1600 mL          LABS:                        13.7   10.02 )-----------( 230      ( 10 Victoriano 2023 17:53 )             41.0     01-10    135  |  102  |  10.2  ----------------------------<  140<H>  4.2   |  18.0<L>  |  0.50    Ca    8.7      10 Victoriano 2023 17:53          
INTERVAL HPI/OVERNIGHT EVENTS:    POD2 of robotic sleeve repair of hiatal hernia. Patient evaluated at bedside. No acute distress. No acute events overnight. She was been walking and tolerating liquids with no issues. She denies nausea and vomiting. Pain is well controlled. Urinary and passing gas spontaneously.     MEDICATIONS  (STANDING):  acetaminophen    Suspension .. 975 milliGRAM(s) Oral every 6 hours  heparin   Injectable 5000 Unit(s) SubCutaneous every 8 hours  influenza   Vaccine 0.5 milliLiter(s) IntraMuscular once  ondansetron Injectable 4 milliGRAM(s) IV Push every 6 hours  pantoprazole  Injectable 40 milliGRAM(s) IV Push every 24 hours  sodium chloride 0.9% 1000 milliLiter(s) (250 mL/Hr) IV Continuous <Continuous>  sodium chloride 0.9% lock flush 3 milliLiter(s) IV Push every 8 hours    MEDICATIONS  (PRN):  aprepitant 40 milliGRAM(s) Oral once PRN Nausea and/or Vomiting  hyoscyamine SL 0.125 milliGRAM(s) SubLingual every 4 hours PRN Gastric Spasms  ketorolac   Injectable 15 milliGRAM(s) IV Push every 6 hours PRN Incisional pain  LORazepam   Injectable 0.5 milliGRAM(s) IV Push once PRN Esophageal Spasm  metoclopramide Injectable 10 milliGRAM(s) IV Push every 6 hours PRN nausea      Vital Signs Last 24 Hrs  T(C): 36.7 (12 Jan 2023 00:25), Max: 36.9 (11 Jan 2023 04:55)  T(F): 98.1 (12 Jan 2023 00:25), Max: 98.5 (11 Jan 2023 04:55)  HR: 66 (12 Jan 2023 00:25) (66 - 81)  BP: 129/71 (12 Jan 2023 00:25) (128/85 - 140/80)  BP(mean): --  RR: 20 (12 Jan 2023 00:25) (16 - 20)  SpO2: 96% (12 Jan 2023 00:25) (96% - 97%)    Parameters below as of 12 Jan 2023 00:25  Patient On (Oxygen Delivery Method): room air        Constitutional: patient resting comfortably in bed, in no acute distress  HEENT: EOMI, no active drainage or redness  Neck: Full ROM without pain  Respiratory: respirations are unlabored, no accessory muscle use, no conversational dyspnea  Cardiovascular: regular rate & rhythm  Gastrointestinal: Abdomen soft, minimally tender, non-distended. Incisions with glue, clean without bleeding or drainage.    Neurological: A&O x 3; no gross sensory / motor / coordination deficits  Musculoskeletal: No limitation of movement      I&O's Detail    10 Victoriano 2023 07:01  -  11 Jan 2023 07:00  --------------------------------------------------------  IN:    Lactated Ringers: 1125 mL    Oral Fluid: 30 mL    sodium chloride 0.9% w/ Additives: 250 mL  Total IN: 1405 mL    OUT:    Voided (mL): 3050 mL  Total OUT: 3050 mL    Total NET: -1645 mL      11 Jan 2023 07:01  -  12 Jan 2023 01:49  --------------------------------------------------------  IN:    Oral Fluid: 120 mL  Total IN: 120 mL    OUT:    Voided (mL): 200 mL  Total OUT: 200 mL    Total NET: -80 mL          LABS:                        13.5   10.57 )-----------( 349      ( 11 Jan 2023 06:21 )             39.7     01-10    135  |  102  |  10.2  ----------------------------<  140<H>  4.2   |  18.0<L>  |  0.50    Ca    8.7      10 Victoriano 2023 17:53            RADIOLOGY & ADDITIONAL STUDIES:

## 2023-01-12 NOTE — DISCHARGE NOTE NURSING/CASE MANAGEMENT/SOCIAL WORK - NSDCPEFALRISK_GEN_ALL_CORE
For information on Fall & Injury Prevention, visit: [email or web address] OR  [email or web address] OR  https://www.cdc.gov/steadi/patient.html

## 2023-01-12 NOTE — PROGRESS NOTE ADULT - ATTENDING COMMENTS
Nausea resolved. Denies pain. Tolerating liquids  Plan for discharge home when discharge criteria are met

## 2023-01-13 ENCOUNTER — NON-APPOINTMENT (OUTPATIENT)
Age: 38
End: 2023-01-13

## 2023-01-20 LAB — SURGICAL PATHOLOGY STUDY: SIGNIFICANT CHANGE UP

## 2023-01-26 ENCOUNTER — APPOINTMENT (OUTPATIENT)
Dept: SURGERY | Facility: CLINIC | Age: 38
End: 2023-01-26
Payer: MEDICAID

## 2023-01-26 VITALS
TEMPERATURE: 97.3 F | HEIGHT: 63 IN | WEIGHT: 225.38 LBS | HEART RATE: 85 BPM | RESPIRATION RATE: 16 BRPM | DIASTOLIC BLOOD PRESSURE: 77 MMHG | OXYGEN SATURATION: 97 % | BODY MASS INDEX: 39.93 KG/M2 | SYSTOLIC BLOOD PRESSURE: 115 MMHG

## 2023-01-26 PROCEDURE — 99024 POSTOP FOLLOW-UP VISIT: CPT

## 2023-02-01 NOTE — HISTORY OF PRESENT ILLNESS
[de-identified] : Procedure Details: Robotic sleeve gastrectomy and hiatal hernia repair \par Procedure Date: 1/10/23\par Surgeon Name: Dr. Rodriguez\par Post Operative: 2 weeks\par \par Diet: Phase 1, advance to phase 2\par Activities: walking, exercise encouraged \par \par Taking adequate PO water and protein

## 2023-02-15 ENCOUNTER — APPOINTMENT (OUTPATIENT)
Dept: FAMILY MEDICINE | Facility: CLINIC | Age: 38
End: 2023-02-15
Payer: MEDICAID

## 2023-02-15 VITALS
BODY MASS INDEX: 40.04 KG/M2 | DIASTOLIC BLOOD PRESSURE: 68 MMHG | HEIGHT: 63 IN | SYSTOLIC BLOOD PRESSURE: 118 MMHG | HEART RATE: 78 BPM | OXYGEN SATURATION: 98 % | WEIGHT: 226 LBS

## 2023-02-15 DIAGNOSIS — E55.9 VITAMIN D DEFICIENCY, UNSPECIFIED: ICD-10-CM

## 2023-02-15 DIAGNOSIS — R53.83 OTHER FATIGUE: ICD-10-CM

## 2023-02-15 DIAGNOSIS — E78.00 PURE HYPERCHOLESTEROLEMIA, UNSPECIFIED: ICD-10-CM

## 2023-02-15 DIAGNOSIS — R09.81 NASAL CONGESTION: ICD-10-CM

## 2023-02-15 PROCEDURE — 99214 OFFICE O/P EST MOD 30 MIN: CPT | Mod: 25

## 2023-02-15 RX ORDER — FLUTICASONE PROPIONATE 50 UG/1
50 SPRAY, METERED NASAL DAILY
Qty: 1 | Refills: 0 | Status: ACTIVE | COMMUNITY
Start: 2023-02-15 | End: 1900-01-01

## 2023-02-16 LAB
ALBUMIN SERPL ELPH-MCNC: 4 G/DL
ALP BLD-CCNC: 83 U/L
ALT SERPL-CCNC: 20 U/L
ANION GAP SERPL CALC-SCNC: 16 MMOL/L
AST SERPL-CCNC: 25 U/L
BASOPHILS # BLD AUTO: 0.04 K/UL
BASOPHILS NFR BLD AUTO: 0.7 %
BILIRUB SERPL-MCNC: 0.5 MG/DL
BUN SERPL-MCNC: 9 MG/DL
CALCIUM SERPL-MCNC: 9.6 MG/DL
CHLORIDE SERPL-SCNC: 103 MMOL/L
CHOLEST SERPL-MCNC: 189 MG/DL
CO2 SERPL-SCNC: 21 MMOL/L
CREAT SERPL-MCNC: 0.45 MG/DL
EGFR: 127 ML/MIN/1.73M2
EOSINOPHIL # BLD AUTO: 0.34 K/UL
EOSINOPHIL NFR BLD AUTO: 6.3 %
ESTIMATED AVERAGE GLUCOSE: 88 MG/DL
GLUCOSE SERPL-MCNC: 81 MG/DL
HBA1C MFR BLD HPLC: 4.7 %
HCT VFR BLD CALC: 41.4 %
HDLC SERPL-MCNC: 33 MG/DL
HGB BLD-MCNC: 13.4 G/DL
IMM GRANULOCYTES NFR BLD AUTO: 0.2 %
INFLUENZA A RESULT: NOT DETECTED
INFLUENZA B RESULT: NOT DETECTED
LDLC SERPL CALC-MCNC: 138 MG/DL
LYMPHOCYTES # BLD AUTO: 1.47 K/UL
LYMPHOCYTES NFR BLD AUTO: 27.4 %
MAN DIFF?: NORMAL
MCHC RBC-ENTMCNC: 30.2 PG
MCHC RBC-ENTMCNC: 32.4 GM/DL
MCV RBC AUTO: 93.2 FL
MONOCYTES # BLD AUTO: 0.32 K/UL
MONOCYTES NFR BLD AUTO: 6 %
NEUTROPHILS # BLD AUTO: 3.18 K/UL
NEUTROPHILS NFR BLD AUTO: 59.4 %
NONHDLC SERPL-MCNC: 155 MG/DL
PLATELET # BLD AUTO: 310 K/UL
POTASSIUM SERPL-SCNC: 3.7 MMOL/L
PROT SERPL-MCNC: 7 G/DL
RBC # BLD: 4.44 M/UL
RBC # FLD: 14.6 %
RESP SYN VIRUS RESULT: NOT DETECTED
SARS-COV-2 RESULT: NOT DETECTED
SODIUM SERPL-SCNC: 141 MMOL/L
T4 FREE SERPL-MCNC: 1.1 NG/DL
TRIGL SERPL-MCNC: 86 MG/DL
TSH SERPL-ACNC: 2.29 UIU/ML
VIT B12 SERPL-MCNC: 1030 PG/ML
WBC # FLD AUTO: 5.36 K/UL

## 2023-02-17 LAB — 25(OH)D3 SERPL-MCNC: 19.5 NG/ML

## 2023-02-20 PROBLEM — E55.9 VITAMIN D INSUFFICIENCY: Status: ACTIVE | Noted: 2021-10-19

## 2023-02-20 PROBLEM — R53.83 FATIGUE: Status: ACTIVE | Noted: 2023-02-20

## 2023-02-20 PROBLEM — R09.81 NASAL CONGESTION: Status: ACTIVE | Noted: 2023-02-15

## 2023-02-20 PROBLEM — E78.00 HYPERCHOLESTEREMIA: Status: ACTIVE | Noted: 2021-10-19

## 2023-02-20 NOTE — HISTORY OF PRESENT ILLNESS
[FreeTextEntry1] : TIRED [de-identified] : MS. ZAMUDIO IS A PLEASANT 37 YO PRESENTING FOR FOLLOW-UP.  SURGERY FOR WEIGHT LOSS WENT WELL.  IS GOING TO THE GYM.  WATCHING DIET.  FOLLOWING UP WITH SURGEON.  FEELS LIKE SHE HAS HIT A PLATEAU. WOULD LIKE THYROID TESTING.  IS SOMETIMES TIRED.  LMP: 2 WEEKS AGO.  HAS ALSO HAD CONGESTION SINCE YESTERDAY.  NO FEVER OR COUGH.  NO SORE THROAT.  NO N/V/D.  NO HEADACHE OR CHEST PAIN.  NO DIZZINESS OR SHORTNESS OF BREATH.  NO KNOWN SICK CONTACTS.  NO MEDICATIONS TAKEN FOR SYMPTOMS.

## 2023-02-20 NOTE — REVIEW OF SYSTEMS
[Fatigue] : fatigue [Nasal Discharge] : nasal discharge [Negative] : Gastrointestinal [Fever] : no fever [Chills] : no chills [Earache] : no earache [Sore Throat] : no sore throat

## 2023-02-20 NOTE — PHYSICAL EXAM
[No Acute Distress] : no acute distress [Well Nourished] : well nourished [Well-Appearing] : well-appearing [Normal Sclera/Conjunctiva] : normal sclera/conjunctiva [PERRL] : pupils equal round and reactive to light [Normal Outer Ear/Nose] : the outer ears and nose were normal in appearance [Normal Oropharynx] : the oropharynx was normal [No Respiratory Distress] : no respiratory distress  [No Accessory Muscle Use] : no accessory muscle use [Clear to Auscultation] : lungs were clear to auscultation bilaterally [Normal Rate] : normal rate  [Regular Rhythm] : with a regular rhythm [Normal S1, S2] : normal S1 and S2 [Speech Grossly Normal] : speech grossly normal [Memory Grossly Normal] : memory grossly normal [Normal Mood] : the mood was normal

## 2023-02-20 NOTE — PLAN
[FreeTextEntry1] : CONSULTANT NOTES AND PRIOR LAB WORK REVIEWED\par WILL GET UPDATED LAB WORK FOR TFT'S, VITAMIN D, LIPIDS, HBA1C\par HEALTHY DIET AND LIFESTYLE MODIFICATIONS\par HEALTHY WEIGHT LOSS \par SUPPORTIVE CARE: REST, FLUIDS, STEAM\par WILL SWAB FOR FLU, COVID, RSV\par ISOLATIONG PENDING RESULTS\par USE OF NASAL SPRAY\par CALL WITH ANY QUESTIONS, CONCERNS OR CHANGES

## 2023-03-02 ENCOUNTER — APPOINTMENT (OUTPATIENT)
Dept: SURGERY | Facility: CLINIC | Age: 38
End: 2023-03-02
Payer: MEDICAID

## 2023-03-02 VITALS
BODY MASS INDEX: 38.98 KG/M2 | TEMPERATURE: 97.8 F | WEIGHT: 220 LBS | SYSTOLIC BLOOD PRESSURE: 108 MMHG | DIASTOLIC BLOOD PRESSURE: 72 MMHG | RESPIRATION RATE: 16 BRPM | HEART RATE: 76 BPM | OXYGEN SATURATION: 98 % | HEIGHT: 63 IN

## 2023-03-02 PROCEDURE — 99024 POSTOP FOLLOW-UP VISIT: CPT

## 2023-03-02 NOTE — HISTORY OF PRESENT ILLNESS
[de-identified] : Procedure Details: Robotic sleeve gastrectomy and hiatal hernia repair \par Procedure Date: 1/10/23\par Surgeon Name: Dr. Rodriguez\par Post Operative: 2 month\par \par  [de-identified] : \par Activities: walking, exercise encouraged \par \par Taking adequate PO water and protein. \par \par No new concerns \par \par Had questions about hypothyroidism but followed with PCP and lab work came back normal

## 2023-03-23 NOTE — ED PROVIDER NOTE - DURATION
Pt comes in for flank pain started 0200, has HX of kidney stones.  9/10 pain, +N/V, no diarrhea.  Romanian speaking only used  for triage.  Took Advil around 0330 with little relief, allergies to penicillin and contrast=rash, + for pain on urination    day(s)/1

## 2023-04-04 ENCOUNTER — APPOINTMENT (OUTPATIENT)
Dept: DERMATOLOGY | Facility: CLINIC | Age: 38
End: 2023-04-04
Payer: MEDICAID

## 2023-04-04 PROCEDURE — 99212 OFFICE O/P EST SF 10 MIN: CPT | Mod: 25

## 2023-04-04 PROCEDURE — 11104 PUNCH BX SKIN SINGLE LESION: CPT

## 2023-04-18 ENCOUNTER — APPOINTMENT (OUTPATIENT)
Dept: DERMATOLOGY | Facility: CLINIC | Age: 38
End: 2023-04-18
Payer: MEDICAID

## 2023-04-18 PROCEDURE — 99212 OFFICE O/P EST SF 10 MIN: CPT

## 2023-04-25 ENCOUNTER — APPOINTMENT (OUTPATIENT)
Dept: FAMILY MEDICINE | Facility: CLINIC | Age: 38
End: 2023-04-25

## 2023-05-17 ENCOUNTER — APPOINTMENT (OUTPATIENT)
Dept: DERMATOLOGY | Facility: CLINIC | Age: 38
End: 2023-05-17
Payer: MEDICAID

## 2023-05-17 PROCEDURE — 99212 OFFICE O/P EST SF 10 MIN: CPT

## 2023-07-06 ENCOUNTER — APPOINTMENT (OUTPATIENT)
Dept: SURGERY | Facility: CLINIC | Age: 38
End: 2023-07-06
Payer: MEDICAID

## 2023-07-06 VITALS
WEIGHT: 205.31 LBS | SYSTOLIC BLOOD PRESSURE: 112 MMHG | RESPIRATION RATE: 16 BRPM | DIASTOLIC BLOOD PRESSURE: 70 MMHG | OXYGEN SATURATION: 98 % | HEIGHT: 63 IN | BODY MASS INDEX: 36.38 KG/M2 | HEART RATE: 62 BPM | TEMPERATURE: 98.1 F

## 2023-07-06 PROCEDURE — 99214 OFFICE O/P EST MOD 30 MIN: CPT

## 2023-07-06 NOTE — HISTORY OF PRESENT ILLNESS
[de-identified] : Procedure Details: Robotic sleeve gastrectomy and hiatal hernia repair \par Procedure Date: 1/10/23\par Surgeon Name: Dr. Rodriguez\par Post Operative: 6 months \par \par Diet: Phase 4\par Activities: going to gym 2-3x per week\par \par Taking adequate PO water and protein

## 2023-10-23 ENCOUNTER — EMERGENCY (EMERGENCY)
Facility: HOSPITAL | Age: 38
LOS: 1 days | Discharge: HOME | End: 2023-10-23
Attending: STUDENT IN AN ORGANIZED HEALTH CARE EDUCATION/TRAINING PROGRAM
Payer: MEDICAID

## 2023-10-23 VITALS
RESPIRATION RATE: 18 BRPM | DIASTOLIC BLOOD PRESSURE: 82 MMHG | OXYGEN SATURATION: 100 % | TEMPERATURE: 98 F | WEIGHT: 179.02 LBS | SYSTOLIC BLOOD PRESSURE: 124 MMHG | HEART RATE: 47 BPM

## 2023-10-23 VITALS
SYSTOLIC BLOOD PRESSURE: 125 MMHG | OXYGEN SATURATION: 100 % | HEART RATE: 76 BPM | DIASTOLIC BLOOD PRESSURE: 76 MMHG | RESPIRATION RATE: 18 BRPM

## 2023-10-23 LAB
ALBUMIN SERPL ELPH-MCNC: 4.2 G/DL — SIGNIFICANT CHANGE UP (ref 3.3–5.2)
ALBUMIN SERPL ELPH-MCNC: 4.2 G/DL — SIGNIFICANT CHANGE UP (ref 3.3–5.2)
ALP SERPL-CCNC: 99 U/L — SIGNIFICANT CHANGE UP (ref 40–120)
ALP SERPL-CCNC: 99 U/L — SIGNIFICANT CHANGE UP (ref 40–120)
ALT FLD-CCNC: 7 U/L — SIGNIFICANT CHANGE UP
ALT FLD-CCNC: 7 U/L — SIGNIFICANT CHANGE UP
ANION GAP SERPL CALC-SCNC: 14 MMOL/L — SIGNIFICANT CHANGE UP (ref 5–17)
ANION GAP SERPL CALC-SCNC: 14 MMOL/L — SIGNIFICANT CHANGE UP (ref 5–17)
APPEARANCE UR: CLEAR — SIGNIFICANT CHANGE UP
APPEARANCE UR: CLEAR — SIGNIFICANT CHANGE UP
AST SERPL-CCNC: 14 U/L — SIGNIFICANT CHANGE UP
AST SERPL-CCNC: 14 U/L — SIGNIFICANT CHANGE UP
BASE EXCESS BLDV CALC-SCNC: -1.4 MMOL/L — SIGNIFICANT CHANGE UP (ref -2–3)
BASE EXCESS BLDV CALC-SCNC: -1.4 MMOL/L — SIGNIFICANT CHANGE UP (ref -2–3)
BASOPHILS # BLD AUTO: 0.02 K/UL — SIGNIFICANT CHANGE UP (ref 0–0.2)
BASOPHILS # BLD AUTO: 0.02 K/UL — SIGNIFICANT CHANGE UP (ref 0–0.2)
BASOPHILS NFR BLD AUTO: 0.3 % — SIGNIFICANT CHANGE UP (ref 0–2)
BASOPHILS NFR BLD AUTO: 0.3 % — SIGNIFICANT CHANGE UP (ref 0–2)
BILIRUB SERPL-MCNC: 0.3 MG/DL — LOW (ref 0.4–2)
BILIRUB SERPL-MCNC: 0.3 MG/DL — LOW (ref 0.4–2)
BILIRUB UR-MCNC: NEGATIVE — SIGNIFICANT CHANGE UP
BILIRUB UR-MCNC: NEGATIVE — SIGNIFICANT CHANGE UP
BUN SERPL-MCNC: 14.4 MG/DL — SIGNIFICANT CHANGE UP (ref 8–20)
BUN SERPL-MCNC: 14.4 MG/DL — SIGNIFICANT CHANGE UP (ref 8–20)
CA-I SERPL-SCNC: 1.2 MMOL/L — SIGNIFICANT CHANGE UP (ref 1.15–1.33)
CA-I SERPL-SCNC: 1.2 MMOL/L — SIGNIFICANT CHANGE UP (ref 1.15–1.33)
CALCIUM SERPL-MCNC: 9.4 MG/DL — SIGNIFICANT CHANGE UP (ref 8.4–10.5)
CALCIUM SERPL-MCNC: 9.4 MG/DL — SIGNIFICANT CHANGE UP (ref 8.4–10.5)
CHLORIDE BLDV-SCNC: 104 MMOL/L — SIGNIFICANT CHANGE UP (ref 96–108)
CHLORIDE BLDV-SCNC: 104 MMOL/L — SIGNIFICANT CHANGE UP (ref 96–108)
CHLORIDE SERPL-SCNC: 103 MMOL/L — SIGNIFICANT CHANGE UP (ref 96–108)
CHLORIDE SERPL-SCNC: 103 MMOL/L — SIGNIFICANT CHANGE UP (ref 96–108)
CO2 SERPL-SCNC: 22 MMOL/L — SIGNIFICANT CHANGE UP (ref 22–29)
CO2 SERPL-SCNC: 22 MMOL/L — SIGNIFICANT CHANGE UP (ref 22–29)
COLOR SPEC: YELLOW — SIGNIFICANT CHANGE UP
COLOR SPEC: YELLOW — SIGNIFICANT CHANGE UP
CREAT SERPL-MCNC: 0.65 MG/DL — SIGNIFICANT CHANGE UP (ref 0.5–1.3)
CREAT SERPL-MCNC: 0.65 MG/DL — SIGNIFICANT CHANGE UP (ref 0.5–1.3)
D DIMER BLD IA.RAPID-MCNC: <150 NG/ML DDU — SIGNIFICANT CHANGE UP
D DIMER BLD IA.RAPID-MCNC: <150 NG/ML DDU — SIGNIFICANT CHANGE UP
DIFF PNL FLD: NEGATIVE — SIGNIFICANT CHANGE UP
DIFF PNL FLD: NEGATIVE — SIGNIFICANT CHANGE UP
EGFR: 116 ML/MIN/1.73M2 — SIGNIFICANT CHANGE UP
EGFR: 116 ML/MIN/1.73M2 — SIGNIFICANT CHANGE UP
EOSINOPHIL # BLD AUTO: 0.09 K/UL — SIGNIFICANT CHANGE UP (ref 0–0.5)
EOSINOPHIL # BLD AUTO: 0.09 K/UL — SIGNIFICANT CHANGE UP (ref 0–0.5)
EOSINOPHIL NFR BLD AUTO: 1.2 % — SIGNIFICANT CHANGE UP (ref 0–6)
EOSINOPHIL NFR BLD AUTO: 1.2 % — SIGNIFICANT CHANGE UP (ref 0–6)
GAS PNL BLDV: 136 MMOL/L — SIGNIFICANT CHANGE UP (ref 136–145)
GAS PNL BLDV: 136 MMOL/L — SIGNIFICANT CHANGE UP (ref 136–145)
GAS PNL BLDV: SIGNIFICANT CHANGE UP
GLUCOSE BLDV-MCNC: 98 MG/DL — SIGNIFICANT CHANGE UP (ref 70–99)
GLUCOSE BLDV-MCNC: 98 MG/DL — SIGNIFICANT CHANGE UP (ref 70–99)
GLUCOSE SERPL-MCNC: 91 MG/DL — SIGNIFICANT CHANGE UP (ref 70–99)
GLUCOSE SERPL-MCNC: 91 MG/DL — SIGNIFICANT CHANGE UP (ref 70–99)
GLUCOSE UR QL: NEGATIVE MG/DL — SIGNIFICANT CHANGE UP
GLUCOSE UR QL: NEGATIVE MG/DL — SIGNIFICANT CHANGE UP
HCG SERPL QL: NEGATIVE — SIGNIFICANT CHANGE UP
HCG SERPL QL: NEGATIVE — SIGNIFICANT CHANGE UP
HCG SERPL-ACNC: <4 MIU/ML — SIGNIFICANT CHANGE UP
HCG SERPL-ACNC: <4 MIU/ML — SIGNIFICANT CHANGE UP
HCO3 BLDV-SCNC: 25 MMOL/L — SIGNIFICANT CHANGE UP (ref 22–29)
HCO3 BLDV-SCNC: 25 MMOL/L — SIGNIFICANT CHANGE UP (ref 22–29)
HCT VFR BLD CALC: 41.4 % — SIGNIFICANT CHANGE UP (ref 34.5–45)
HCT VFR BLD CALC: 41.4 % — SIGNIFICANT CHANGE UP (ref 34.5–45)
HCT VFR BLDA CALC: 44 % — SIGNIFICANT CHANGE UP
HCT VFR BLDA CALC: 44 % — SIGNIFICANT CHANGE UP
HGB BLD CALC-MCNC: 14.5 G/DL — SIGNIFICANT CHANGE UP (ref 11.7–16.1)
HGB BLD CALC-MCNC: 14.5 G/DL — SIGNIFICANT CHANGE UP (ref 11.7–16.1)
HGB BLD-MCNC: 14 G/DL — SIGNIFICANT CHANGE UP (ref 11.5–15.5)
HGB BLD-MCNC: 14 G/DL — SIGNIFICANT CHANGE UP (ref 11.5–15.5)
HIV 1 & 2 AB SERPL IA.RAPID: SIGNIFICANT CHANGE UP
HIV 1 & 2 AB SERPL IA.RAPID: SIGNIFICANT CHANGE UP
IMM GRANULOCYTES NFR BLD AUTO: 0.3 % — SIGNIFICANT CHANGE UP (ref 0–0.9)
IMM GRANULOCYTES NFR BLD AUTO: 0.3 % — SIGNIFICANT CHANGE UP (ref 0–0.9)
KETONES UR-MCNC: ABNORMAL
KETONES UR-MCNC: ABNORMAL
LACTATE BLDV-MCNC: 1.8 MMOL/L — SIGNIFICANT CHANGE UP (ref 0.5–2)
LACTATE BLDV-MCNC: 1.8 MMOL/L — SIGNIFICANT CHANGE UP (ref 0.5–2)
LEUKOCYTE ESTERASE UR-ACNC: NEGATIVE — SIGNIFICANT CHANGE UP
LEUKOCYTE ESTERASE UR-ACNC: NEGATIVE — SIGNIFICANT CHANGE UP
LIDOCAIN IGE QN: 62 U/L — HIGH (ref 22–51)
LIDOCAIN IGE QN: 62 U/L — HIGH (ref 22–51)
LYMPHOCYTES # BLD AUTO: 1.44 K/UL — SIGNIFICANT CHANGE UP (ref 1–3.3)
LYMPHOCYTES # BLD AUTO: 1.44 K/UL — SIGNIFICANT CHANGE UP (ref 1–3.3)
LYMPHOCYTES # BLD AUTO: 19.3 % — SIGNIFICANT CHANGE UP (ref 13–44)
LYMPHOCYTES # BLD AUTO: 19.3 % — SIGNIFICANT CHANGE UP (ref 13–44)
MAGNESIUM SERPL-MCNC: 1.9 MG/DL — SIGNIFICANT CHANGE UP (ref 1.8–2.6)
MAGNESIUM SERPL-MCNC: 1.9 MG/DL — SIGNIFICANT CHANGE UP (ref 1.8–2.6)
MCHC RBC-ENTMCNC: 29.7 PG — SIGNIFICANT CHANGE UP (ref 27–34)
MCHC RBC-ENTMCNC: 29.7 PG — SIGNIFICANT CHANGE UP (ref 27–34)
MCHC RBC-ENTMCNC: 33.8 GM/DL — SIGNIFICANT CHANGE UP (ref 32–36)
MCHC RBC-ENTMCNC: 33.8 GM/DL — SIGNIFICANT CHANGE UP (ref 32–36)
MCV RBC AUTO: 87.7 FL — SIGNIFICANT CHANGE UP (ref 80–100)
MCV RBC AUTO: 87.7 FL — SIGNIFICANT CHANGE UP (ref 80–100)
MONOCYTES # BLD AUTO: 0.38 K/UL — SIGNIFICANT CHANGE UP (ref 0–0.9)
MONOCYTES # BLD AUTO: 0.38 K/UL — SIGNIFICANT CHANGE UP (ref 0–0.9)
MONOCYTES NFR BLD AUTO: 5.1 % — SIGNIFICANT CHANGE UP (ref 2–14)
MONOCYTES NFR BLD AUTO: 5.1 % — SIGNIFICANT CHANGE UP (ref 2–14)
NEUTROPHILS # BLD AUTO: 5.5 K/UL — SIGNIFICANT CHANGE UP (ref 1.8–7.4)
NEUTROPHILS # BLD AUTO: 5.5 K/UL — SIGNIFICANT CHANGE UP (ref 1.8–7.4)
NEUTROPHILS NFR BLD AUTO: 73.8 % — SIGNIFICANT CHANGE UP (ref 43–77)
NEUTROPHILS NFR BLD AUTO: 73.8 % — SIGNIFICANT CHANGE UP (ref 43–77)
NITRITE UR-MCNC: NEGATIVE — SIGNIFICANT CHANGE UP
NITRITE UR-MCNC: NEGATIVE — SIGNIFICANT CHANGE UP
NT-PROBNP SERPL-SCNC: 124 PG/ML — SIGNIFICANT CHANGE UP (ref 0–300)
NT-PROBNP SERPL-SCNC: 124 PG/ML — SIGNIFICANT CHANGE UP (ref 0–300)
PCO2 BLDV: 48 MMHG — HIGH (ref 39–42)
PCO2 BLDV: 48 MMHG — HIGH (ref 39–42)
PH BLDV: 7.32 — SIGNIFICANT CHANGE UP (ref 7.32–7.43)
PH BLDV: 7.32 — SIGNIFICANT CHANGE UP (ref 7.32–7.43)
PH UR: 7 — SIGNIFICANT CHANGE UP (ref 5–8)
PH UR: 7 — SIGNIFICANT CHANGE UP (ref 5–8)
PLATELET # BLD AUTO: 308 K/UL — SIGNIFICANT CHANGE UP (ref 150–400)
PLATELET # BLD AUTO: 308 K/UL — SIGNIFICANT CHANGE UP (ref 150–400)
PO2 BLDV: 84 MMHG — HIGH (ref 25–45)
PO2 BLDV: 84 MMHG — HIGH (ref 25–45)
POTASSIUM BLDV-SCNC: 4.2 MMOL/L — SIGNIFICANT CHANGE UP (ref 3.5–5.1)
POTASSIUM BLDV-SCNC: 4.2 MMOL/L — SIGNIFICANT CHANGE UP (ref 3.5–5.1)
POTASSIUM SERPL-MCNC: 4.2 MMOL/L — SIGNIFICANT CHANGE UP (ref 3.5–5.3)
POTASSIUM SERPL-MCNC: 4.2 MMOL/L — SIGNIFICANT CHANGE UP (ref 3.5–5.3)
POTASSIUM SERPL-SCNC: 4.2 MMOL/L — SIGNIFICANT CHANGE UP (ref 3.5–5.3)
POTASSIUM SERPL-SCNC: 4.2 MMOL/L — SIGNIFICANT CHANGE UP (ref 3.5–5.3)
PROT SERPL-MCNC: 7.8 G/DL — SIGNIFICANT CHANGE UP (ref 6.6–8.7)
PROT SERPL-MCNC: 7.8 G/DL — SIGNIFICANT CHANGE UP (ref 6.6–8.7)
PROT UR-MCNC: NEGATIVE — SIGNIFICANT CHANGE UP
PROT UR-MCNC: NEGATIVE — SIGNIFICANT CHANGE UP
RBC # BLD: 4.72 M/UL — SIGNIFICANT CHANGE UP (ref 3.8–5.2)
RBC # BLD: 4.72 M/UL — SIGNIFICANT CHANGE UP (ref 3.8–5.2)
RBC # FLD: 12.5 % — SIGNIFICANT CHANGE UP (ref 10.3–14.5)
RBC # FLD: 12.5 % — SIGNIFICANT CHANGE UP (ref 10.3–14.5)
SAO2 % BLDV: 97.4 % — SIGNIFICANT CHANGE UP
SAO2 % BLDV: 97.4 % — SIGNIFICANT CHANGE UP
SODIUM SERPL-SCNC: 138 MMOL/L — SIGNIFICANT CHANGE UP (ref 135–145)
SODIUM SERPL-SCNC: 138 MMOL/L — SIGNIFICANT CHANGE UP (ref 135–145)
SP GR SPEC: 1 — LOW (ref 1.01–1.02)
SP GR SPEC: 1 — LOW (ref 1.01–1.02)
T3 SERPL-MCNC: 103 NG/DL — SIGNIFICANT CHANGE UP (ref 80–200)
T3 SERPL-MCNC: 103 NG/DL — SIGNIFICANT CHANGE UP (ref 80–200)
T4 AB SER-ACNC: 7.2 UG/DL — SIGNIFICANT CHANGE UP (ref 4.5–12)
T4 AB SER-ACNC: 7.2 UG/DL — SIGNIFICANT CHANGE UP (ref 4.5–12)
TROPONIN T SERPL-MCNC: <0.01 NG/ML — SIGNIFICANT CHANGE UP (ref 0–0.06)
TROPONIN T SERPL-MCNC: <0.01 NG/ML — SIGNIFICANT CHANGE UP (ref 0–0.06)
TSH SERPL-MCNC: 2.64 UIU/ML — SIGNIFICANT CHANGE UP (ref 0.27–4.2)
TSH SERPL-MCNC: 2.64 UIU/ML — SIGNIFICANT CHANGE UP (ref 0.27–4.2)
UROBILINOGEN FLD QL: NEGATIVE MG/DL — SIGNIFICANT CHANGE UP
UROBILINOGEN FLD QL: NEGATIVE MG/DL — SIGNIFICANT CHANGE UP
WBC # BLD: 7.45 K/UL — SIGNIFICANT CHANGE UP (ref 3.8–10.5)
WBC # BLD: 7.45 K/UL — SIGNIFICANT CHANGE UP (ref 3.8–10.5)
WBC # FLD AUTO: 7.45 K/UL — SIGNIFICANT CHANGE UP (ref 3.8–10.5)
WBC # FLD AUTO: 7.45 K/UL — SIGNIFICANT CHANGE UP (ref 3.8–10.5)

## 2023-10-23 PROCEDURE — 80053 COMPREHEN METABOLIC PANEL: CPT

## 2023-10-23 PROCEDURE — 84702 CHORIONIC GONADOTROPIN TEST: CPT

## 2023-10-23 PROCEDURE — 85018 HEMOGLOBIN: CPT

## 2023-10-23 PROCEDURE — 83605 ASSAY OF LACTIC ACID: CPT

## 2023-10-23 PROCEDURE — 83880 ASSAY OF NATRIURETIC PEPTIDE: CPT

## 2023-10-23 PROCEDURE — 83690 ASSAY OF LIPASE: CPT

## 2023-10-23 PROCEDURE — 71045 X-RAY EXAM CHEST 1 VIEW: CPT | Mod: 26

## 2023-10-23 PROCEDURE — 84295 ASSAY OF SERUM SODIUM: CPT

## 2023-10-23 PROCEDURE — 93005 ELECTROCARDIOGRAM TRACING: CPT

## 2023-10-23 PROCEDURE — 84703 CHORIONIC GONADOTROPIN ASSAY: CPT

## 2023-10-23 PROCEDURE — 85610 PROTHROMBIN TIME: CPT

## 2023-10-23 PROCEDURE — 86703 HIV-1/HIV-2 1 RESULT ANTBDY: CPT

## 2023-10-23 PROCEDURE — 84484 ASSAY OF TROPONIN QUANT: CPT

## 2023-10-23 PROCEDURE — 85379 FIBRIN DEGRADATION QUANT: CPT

## 2023-10-23 PROCEDURE — 84132 ASSAY OF SERUM POTASSIUM: CPT

## 2023-10-23 PROCEDURE — 83735 ASSAY OF MAGNESIUM: CPT

## 2023-10-23 PROCEDURE — 85014 HEMATOCRIT: CPT

## 2023-10-23 PROCEDURE — 84436 ASSAY OF TOTAL THYROXINE: CPT

## 2023-10-23 PROCEDURE — 71045 X-RAY EXAM CHEST 1 VIEW: CPT

## 2023-10-23 PROCEDURE — 82803 BLOOD GASES ANY COMBINATION: CPT

## 2023-10-23 PROCEDURE — 96374 THER/PROPH/DIAG INJ IV PUSH: CPT

## 2023-10-23 PROCEDURE — 99285 EMERGENCY DEPT VISIT HI MDM: CPT | Mod: 25

## 2023-10-23 PROCEDURE — 36415 COLL VENOUS BLD VENIPUNCTURE: CPT

## 2023-10-23 PROCEDURE — 99291 CRITICAL CARE FIRST HOUR: CPT

## 2023-10-23 PROCEDURE — 85025 COMPLETE CBC W/AUTO DIFF WBC: CPT

## 2023-10-23 PROCEDURE — 93010 ELECTROCARDIOGRAM REPORT: CPT | Mod: 76

## 2023-10-23 PROCEDURE — 84480 ASSAY TRIIODOTHYRONINE (T3): CPT

## 2023-10-23 PROCEDURE — 81003 URINALYSIS AUTO W/O SCOPE: CPT

## 2023-10-23 PROCEDURE — 82947 ASSAY GLUCOSE BLOOD QUANT: CPT

## 2023-10-23 PROCEDURE — 82330 ASSAY OF CALCIUM: CPT

## 2023-10-23 PROCEDURE — 82435 ASSAY OF BLOOD CHLORIDE: CPT

## 2023-10-23 PROCEDURE — 84443 ASSAY THYROID STIM HORMONE: CPT

## 2023-10-23 RX ORDER — DILTIAZEM HCL 120 MG
5 CAPSULE, EXT RELEASE 24 HR ORAL ONCE
Refills: 0 | Status: COMPLETED | OUTPATIENT
Start: 2023-10-23 | End: 2023-10-23

## 2023-10-23 RX ADMIN — Medication 5 MILLIGRAM(S): at 19:45

## 2023-10-23 NOTE — ED PROVIDER NOTE - NSFOLLOWUPINSTRUCTIONS_ED_ALL_ED_FT
1) Follow up with your doctor in one week  2) Return to the ER for worsening or concerning symptoms  3) Consider following up with the listed cardiologist for further evaluation of your episode of atrial fibrillation    Atrial Fibrillation       Atrial fibrillation is a type of irregular or rapid heartbeat (arrhythmia). In atrial fibrillation, the top part of the heart (atria) beats in an irregular pattern. This makes the heart unable to pump blood normally and effectively.    The goal of treatment is to prevent blood clots from forming, control your heart rate, or restore your heartbeat to a normal rhythm. If this condition is not treated, it can cause serious problems, such as a weakened heart muscle (cardiomyopathy) or a stroke.    What are the causes?  This condition is often caused by medical conditions that damage the heart's electrical system. These include:    High blood pressure (hypertension). This is the most common cause.  Certain heart problems or conditions, such as heart failure, coronary artery disease, heart valve problems, or heart surgery.  Diabetes.  Overactive thyroid (hyperthyroidism).  Obesity.  Chronic kidney disease.    In some cases, the cause of this condition is not known.    What increases the risk?  This condition is more likely to develop in:    Older people.  People who smoke.  Athletes who do endurance exercise.  People who have a family history of atrial fibrillation.  Men.  People who use drugs.  People who drink a lot of alcohol.  People who have lung conditions, such as emphysema, pneumonia, or COPD.  People who have obstructive sleep apnea.    What are the signs or symptoms?  Symptoms of this condition include:    A feeling that your heart is racing or beating irregularly.  Discomfort or pain in your chest.  Shortness of breath.  Sudden light-headedness or weakness.  Tiring easily during exercise or activity.  Fatigue.  Syncope (fainting).  Sweating.    In some cases, there are no symptoms.    How is this diagnosed?  Your health care provider may detect atrial fibrillation when taking your pulse. If detected, this condition may be diagnosed with:    An electrocardiogram (ECG) to check electrical signals of the heart.  An ambulatory cardiac monitor to record your heart's activity for a few days.  A transthoracic echocardiogram (TTE) to create pictures of your heart.  A transesophageal echocardiogram (BRODY) to create even closer pictures of your heart.  A stress test to check your blood supply while you exercise.  Imaging tests, such as a CT scan or chest X-ray.  Blood tests.    How is this treated?  Treatment depends on underlying conditions and how you feel when you experience atrial fibrillation. This condition may be treated with:    Medicines to prevent blood clots or to treat heart rate or heart rhythm problems.  Electrical cardioversion to reset the heart's rhythm.  A pacemaker to correct abnormal heart rhythm.  Ablation to remove the heart tissue that sends abnormal signals.  Left atrial appendage closure to seal the area where blood clots can form.    In some cases, underlying conditions will be treated.    Follow these instructions at home:      Medicines    Take over-the counter and prescription medicines only as told by your health care provider.  Do not take any new medicines without talking to your health care provider.  If you are taking blood thinners:     Talk with your health care provider before you take any medicines that contain aspirin or NSAIDs, such as ibuprofen. These medicines increase your risk for dangerous bleeding.   Take your medicine exactly as told, at the same time every day.   Avoid activities that could cause injury or bruising, and follow instructions about how to prevent falls.   Wear a medical alert bracelet or carry a card that lists what medicines you take.        Lifestyle      Do not use any products that contain nicotine or tobacco, such as cigarettes, e-cigarettes, and chewing tobacco. If you need help quitting, ask your health care provider.  Eat heart-healthy foods. Talk with a dietitian to make an eating plan that is right for you.  Exercise regularly as told by your health care provider.  Do not drink alcohol.  Lose weight if you are overweight.  Do not use drugs, including cannabis.        General instructions    If you have obstructive sleep apnea, manage your condition as told by your health care provider.  Do not use diet pills unless your health care provider approves. Diet pills can make heart problems worse.  Keep all follow-up visits as told by your health care provider. This is important.    Contact a health care provider if you:  Notice a change in the rate, rhythm, or strength of your heartbeat.  Are taking a blood thinner and you notice more bruising.  Tire more easily when you exercise or do heavy work.  Have a sudden change in weight.    Get help right away if you have:     Chest pain, abdominal pain, sweating, or weakness.  Trouble breathing.  Side effects of blood thinners, such as blood in your vomit, stool, or urine, or bleeding that cannot stop.  Any symptoms of a stroke. "BE FAST" is an easy way to remember the main warning signs of a stroke:    B - Balance. Signs are dizziness, sudden trouble walking, or loss of balance.  E - Eyes. Signs are trouble seeing or a sudden change in vision.  F - Face. Signs are sudden weakness or numbness of the face, or the face or eyelid drooping on one side.  A - Arms. Signs are weakness or numbness in an arm. This happens suddenly and usually on one side of the body.  S - Speech. Signs are sudden trouble speaking, slurred speech, or trouble understanding what people say.  T - Time. Time to call emergency services. Write down what time symptoms started.  Other signs of a stroke, such as:    A sudden, severe headache with no known cause.  Nausea or vomiting.  Seizure.    These symptoms may represent a serious problem that is an emergency. Do not wait to see if the symptoms will go away. Get medical help right away. Call your local emergency services (911 in the [place] Do not drive yourself to the hospital.    Summary  Atrial fibrillation is a type of irregular or rapid heartbeat (arrhythmia).  Symptoms include a feeling that your heart is beating fast or irregularly.  You may be given medicines to prevent blood clots or to treat heart rate or heart rhythm problems.  Get help right away if you have signs or symptoms of a stroke.  Get help right away if you cannot catch your breath or have chest pain or pressure.    ADDITIONAL NOTES AND INSTRUCTIONS    Please follow up with your Primary MD in 24-48 hr.  Seek immediate medical care for any new/worsening signs or symptoms. 1) Follow up with your doctor in one week  2) Return to the ER for worsening or concerning symptoms  3) Consider following up with the listed cardiologist for further evaluation of your episode of atrial fibrillation    Atrial Fibrillation       Atrial fibrillation is a type of irregular or rapid heartbeat (arrhythmia). In atrial fibrillation, the top part of the heart (atria) beats in an irregular pattern. This makes the heart unable to pump blood normally and effectively.    The goal of treatment is to prevent blood clots from forming, control your heart rate, or restore your heartbeat to a normal rhythm. If this condition is not treated, it can cause serious problems, such as a weakened heart muscle (cardiomyopathy) or a stroke.    What are the causes?  This condition is often caused by medical conditions that damage the heart's electrical system. These include:    High blood pressure (hypertension). This is the most common cause.  Certain heart problems or conditions, such as heart failure, coronary artery disease, heart valve problems, or heart surgery.  Diabetes.  Overactive thyroid (hyperthyroidism).  Obesity.  Chronic kidney disease.    In some cases, the cause of this condition is not known.    What increases the risk?  This condition is more likely to develop in:    Older people.  People who smoke.  Athletes who do endurance exercise.  People who have a family history of atrial fibrillation.  Men.  People who use drugs.  People who drink a lot of alcohol.  People who have lung conditions, such as emphysema, pneumonia, or COPD.  People who have obstructive sleep apnea.    What are the signs or symptoms?  Symptoms of this condition include:    A feeling that your heart is racing or beating irregularly.  Discomfort or pain in your chest.  Shortness of breath.  Sudden light-headedness or weakness.  Tiring easily during exercise or activity.  Fatigue.  Syncope (fainting).  Sweating.    In some cases, there are no symptoms.    How is this diagnosed?  Your health care provider may detect atrial fibrillation when taking your pulse. If detected, this condition may be diagnosed with:    An electrocardiogram (ECG) to check electrical signals of the heart.  An ambulatory cardiac monitor to record your heart's activity for a few days.  A transthoracic echocardiogram (TTE) to create pictures of your heart.  A transesophageal echocardiogram (BRODY) to create even closer pictures of your heart.  A stress test to check your blood supply while you exercise.  Imaging tests, such as a CT scan or chest X-ray.  Blood tests.    How is this treated?  Treatment depends on underlying conditions and how you feel when you experience atrial fibrillation. This condition may be treated with:    Medicines to prevent blood clots or to treat heart rate or heart rhythm problems.  Electrical cardioversion to reset the heart's rhythm.  A pacemaker to correct abnormal heart rhythm.  Ablation to remove the heart tissue that sends abnormal signals.  Left atrial appendage closure to seal the area where blood clots can form.    In some cases, underlying conditions will be treated.    Follow these instructions at home:      Medicines    Take over-the counter and prescription medicines only as told by your health care provider.  Do not take any new medicines without talking to your health care provider.  If you are taking blood thinners:     Talk with your health care provider before you take any medicines that contain aspirin or NSAIDs, such as ibuprofen. These medicines increase your risk for dangerous bleeding.   Take your medicine exactly as told, at the same time every day.   Avoid activities that could cause injury or bruising, and follow instructions about how to prevent falls.   Wear a medical alert bracelet or carry a card that lists what medicines you take.        Lifestyle      Do not use any products that contain nicotine or tobacco, such as cigarettes, e-cigarettes, and chewing tobacco. If you need help quitting, ask your health care provider.  Eat heart-healthy foods. Talk with a dietitian to make an eating plan that is right for you.  Exercise regularly as told by your health care provider.  Do not drink alcohol.  Lose weight if you are overweight.  Do not use drugs, including cannabis.        General instructions    If you have obstructive sleep apnea, manage your condition as told by your health care provider.  Do not use diet pills unless your health care provider approves. Diet pills can make heart problems worse.  Keep all follow-up visits as told by your health care provider. This is important.    Contact a health care provider if you:  Notice a change in the rate, rhythm, or strength of your heartbeat.  Are taking a blood thinner and you notice more bruising.  Tire more easily when you exercise or do heavy work.  Have a sudden change in weight.    Get help right away if you have:     Chest pain, abdominal pain, sweating, or weakness.  Trouble breathing.  Side effects of blood thinners, such as blood in your vomit, stool, or urine, or bleeding that cannot stop.  Any symptoms of a stroke. "BE FAST" is an easy way to remember the main warning signs of a stroke:    B - Balance. Signs are dizziness, sudden trouble walking, or loss of balance.  E - Eyes. Signs are trouble seeing or a sudden change in vision.  F - Face. Signs are sudden weakness or numbness of the face, or the face or eyelid drooping on one side.  A - Arms. Signs are weakness or numbness in an arm. This happens suddenly and usually on one side of the body.  S - Speech. Signs are sudden trouble speaking, slurred speech, or trouble understanding what people say.  T - Time. Time to call emergency services. Write down what time symptoms started.  Other signs of a stroke, such as:    A sudden, severe headache with no known cause.  Nausea or vomiting.  Seizure.    These symptoms may represent a serious problem that is an emergency. Do not wait to see if the symptoms will go away. Get medical help right away. Call your local emergency services (911 in the [place] Do not drive yourself to the hospital.    Summary  Atrial fibrillation is a type of irregular or rapid heartbeat (arrhythmia).  Symptoms include a feeling that your heart is beating fast or irregularly.  You may be given medicines to prevent blood clots or to treat heart rate or heart rhythm problems.  Get help right away if you have signs or symptoms of a stroke.  Get help right away if you cannot catch your breath or have chest pain or pressure.    ADDITIONAL NOTES AND INSTRUCTIONS    Please follow up with your Primary MD in 24-48 hr.  Seek immediate medical care for any new/worsening signs or symptoms.    Fibrilación auricular  Atrial Fibrillation       La fibrilación auricular es un tipo de latido cardíaco irregular o rápido (arritmia). En la fibrilación auricular, la parte superior del corazón (aurículas) late con un patrón irregular. [place] hace que el corazón no pueda bombear amilcar de manera normal y eficaz.    El objetivo del tratamiento es prevenir la formación de coágulos de amilcar, controlar la frecuencia cardíaca o restablecer el ritmo normal de los latidos cardíacos. Si esta afección no se trata, puede causar graves complicaciones, jose el debilitamiento del músculo cardíaco (miocardiopatía) o un accidente cerebrovascular.    ¿Cuáles son las causas?  Esta afección suele ser causada por otras afecciones que dañan el sistema eléctrico del corazón. Estas incluyen lo siguiente:    Presión arterial peter (hipertensión arterial). Esta es la causa más frecuente.  Ciertos problemas o afecciones del corazón, jose insuficiencia cardíaca, arteriopatía coronaria, problemas en las válvulas cardíacas o cirugía cardíaca.  Diabetes.  Hiperactividad de la glándula tiroidea (hipertiroidismo).  Obesidad.  Enfermedad renal crónica.    En algunos casos, se desconoce la causa de esta afección.    ¿Qué incrementa el riesgo?  Es más probable que esta afección se manifieste en:    Personas de edad avanzada.  Fumadores.  Deportistas que realizan ejercicios de resistencia.  Las personas que tienen antecedentes familiares de fibrilación auricular.  Hombres.  Personas que consumen drogas.  Personas que beben mucho alcohol.  Personas con afecciones pulmonares, jose enfisema, neumonía o EPOC.  Personas que tienen apnea obstructiva del sueño.    ¿Cuáles son los signos o síntomas?  Los síntomas de esta afección incluyen:    Sensación de que el corazón late rápida o irregularmente.  Malestar o dolor en el pecho.  Falta de aire.  Mareos o debilidad repentinos.  Cansarse fácilmente al hacer ejercicio o actividad física.  Fatiga.  Síncope (episodio de desmayo).  Sudoración.    En algunos casos, no hay síntomas.    ¿Cómo se diagnostica?  El médico puede detectar la fibrilación auricular al tomarle el pulso. Si se detecta, esta afección podría diagnosticarse a través de lo siguiente:    Un electrocardiograma (ECG) para controlar las señales eléctricas del corazón.  Un monitor cardíaco ambulatorio para registrar la actividad del corazón melody algunos días.  Un ecocardiograma transtorácico (ETT) para obtener imágenes del corazón.  Un ecocardiograma transesofágico (ETE) para obtener imágenes incluso más cercanas del corazón.  Ronn ergometría para evaluar la irrigación sanguínea mientras hace ejercicio.  Estudios de diagnóstico por imágenes, jose ronn exploración por tomografía computarizada (TC) o ronn radiografía torácica.  Análisis de amilcar.    ¿Cómo se trata?  El tratamiento depende de las afecciones subyacentes y de cómo se siente cuando tiene fibrilación auricular. El tratamiento de esta afección puede incluir:    Medicamentos para prevenir la formación de coágulos de amilcar o tratar los problemas de frecuencia cardíaca o ritmo cardíaco.  Cardioversión eléctrica para restablecer el ritmo cardíaco.  Un marcapasos para corregir el ritmo cardíaco anormal.  Ablación para extirpar el tejido cardíaco que envía señales anormales.  Cierre de la orejuela auricular izquierda para sellar la analy donde pueden formarse coágulos de amilcar.    En algunos casos, se tratarán las afecciones subyacentes.    Siga estas instrucciones en nobles casa:      Medicamentos    [place] los medicamentos de venta erika y los recetados solamente jose se lo haya indicado el médico.  No tome medicamentos nuevos sin antes consultar con nobles médico.  Si está tomando anticoagulantes, tenga en cuenta lo siguiente:     Hable con el médico antes de augusta cualquier medicamento que contenga aspirina o antiinflamatorios no esteroideos (KELLY), jose el ibuprofeno. Estos medicamentos aumentan el riesgo de tener ronn hemorragia peligrosa.   [place] los medicamentos exactamente jose se lo indicaron, todos los días a la misma hora.   Evite las actividades que podrían causarle lesiones o moretones y siga las instrucciones para evitar las caídas.   Use un brazalete de alerta médica o lleve ronn tarjeta con ronn lista de los medicamentos que clover.        Estilo de jadon      No consuma ningún producto que contenga nicotina o tabaco, jose cigarrillos, cigarrillos electrónicos y tabaco de mascar. Si necesita ayuda para dejar de fumar, consulte al médico.  Consuma alimentos cardiosaludables. Hable con un nutricionista para crear un plan de alimentación que sea adecuado para usted.  [place] actividad física habitualmente jose se lo haya indicado el médico.  No wendy alcohol.  Baje de peso si es necesario.  No consuma drogas, ni siquiera cannabis.        Instrucciones generales    Si tiene apnea obstructiva del sueño, controle la afección jose se lo haya indicado el médico.  No tome pastillas para adelgazar a menos que el médico lo autorice. Estas pastillas pueden agravar los problemas cardíacos.  Concurra a todas las visitas de seguimiento jose se lo haya indicado el médico. [place] es importante.    Comuníquese con un médico si:  Nota un cambio en la frecuencia, el ritmo o la fuerza de los latidos cardíacos.  [place] anticoagulantes y observa más moretones.  Se cansa con más facilidad cuando hace ejercicio o hace trabajos pesados.  Tiene cambios repentinos en el peso.    Solicite ayuda inmediatamente si tiene:     Dolor de pecho, dolor abdominal, sudoración o debilidad.  Dificultad para respirar.  Efectos secundarios de los anticoagulantes, jose amilcar en el vómito, las heces o la orina, o sangrado que no puede detenerse.  Cualquier síntoma de un accidente cerebrovascular. “BE FAST” es ronn manera fácil de recordar las principales señales de advertencia de un accidente cerebrovascular:    B - Balance (equilibrio). Los signos son mareos, dificultad repentina para caminar o pérdida del equilibrio.  E - Eyes (ojos). Los signos son problemas para guanaco o un cambio repentino en la visión.  F - Face (rebecca). Los signos son debilidad repentina o adormecimiento del rebecca, o el rebecca o el párpado que se caen hacia un lado.  A - Arms (brazos). Los signos son debilidad o adormecimiento en un brazo. [place] sucede de repente y generalmente en un lado del cuerpo.  S - Speech (habla). Los signos son dificultad para hablar, hablar arrastrando las palabras o dificultad para comprender lo que la gente dice.  T - Time (tiempo). Es tiempo de llamar al servicio de emergencias. [place] la hora a la que comenzaron los síntomas.  Otros signos de accidente cerebrovascular, tales jose:    Dolor de claude súbito e intenso que no tiene causa aparente.  Náuseas o vómitos.  Convulsiones.    Estos síntomas pueden representar un problema grave que constituye ronn emergencia. No espere a guanaco si los síntomas desaparecen. Solicite atención médica de inmediato. Comuníquese con el servicio de emergencias de nobles localidad (911 en los [place]). No conduzca por ramon propios medios hasta el hospital.    Resumen  La fibrilación auricular es un tipo de latido cardíaco irregular o rápido (arritmia).  Los síntomas incluyen sensación de que el corazón late rápida o irregularmente.  Es posible que le den medicamentos para prevenir la formación de coágulos de amilcar o tratar los problemas de frecuencia cardíaca o ritmo cardíaco.  Obtenga ayuda de inmediato si tiene signos o síntomas de un accidente cerebrovascular.  Busque ayuda de inmediato si no puede respirar o siente dolor o presión en el pecho.

## 2023-10-23 NOTE — ED PROVIDER NOTE - PATIENT PORTAL LINK FT
You can access the FollowMyHealth Patient Portal offered by [facility] by registering at the following website: [email or web address]. By joining [employer]’s FollowMyHealth portal, you will also be able to view your health information using other applications (apps) compatible with our system.

## 2023-10-23 NOTE — ED PROVIDER NOTE - CARE PROVIDER_API CALL
Ángela Whitten  Cardiology  [address] [facility], [address] [place] [id number]  Phone: [phone]  Fax: [phone]  Follow Up Time: Routine

## 2023-10-23 NOTE — ED PROVIDER NOTE - CROS ED RESP ALL NEG
".  Chief Complaint   Patient presents with   • Flank Pain     bilateral flank pain   • Painful Urination     \"burning sensation\", denies blood in urine     ./84   Pulse 99   Temp 36.8 °C (98.3 °F)   Resp 17   Ht 1.753 m (5' 9\")   Wt 73.1 kg (161 lb 2.5 oz)   SpO2 99%   BMI 23.80 kg/m²     Ambulatory to triage with above complaints x 1 month, given urine specimen cup, educated on triage process, placed in lobby, told to inform staff of any changes in condition.    "
To room, agree w Triage RN note, has urine spec cup, drinking water, denies nvd or fevers  
negative...

## 2023-10-23 NOTE — ED ADULT NURSE REASSESSMENT NOTE - NS ED NURSE REASSESS COMMENT FT1
plan of care and results discussed with pt by md. pt in no acute distress. VSS. pt reports feeling much better. Dc instructions reviewed with pt & pt verbalizes understanding. ambulated off unit without difficulty.

## 2023-10-23 NOTE — ED PROVIDER NOTE - BIRTH SEX
Detail Level: Detailed Quality 226: Preventive Care And Screening: Tobacco Use: Screening And Cessation Intervention: Patient screened for tobacco use and is an ex/non-smoker Quality 130: Documentation Of Current Medications In The Medical Record: Current Medications Documented Quality 431: Preventive Care And Screening: Unhealthy Alcohol Use - Screening: Patient not identified as an unhealthy alcohol user when screened for unhealthy alcohol use using a systematic screening method Female

## 2023-10-23 NOTE — ED PROVIDER NOTE - OBJECTIVE STATEMENT
37 yo healthy female presents for evaluation of acute palpitations while working out at the  gym this evening. Patient states symptoms began about 30 to an 1 hour prior to presentation. Symptoms have been persistent prompting presentation. Patient denies chest pain, dyspnea, nausea, vomiting, fever, chills, ullicit sbstance use, supplement use, other medication use, or previous episodes.

## 2023-10-23 NOTE — ED PROVIDER NOTE - DIFFERENTIAL DIAGNOSIS
Differential Diagnosis sinus tachycardia  atrial fibrillation  Hyperthyroidism Sinus tachycardia  Atrial fibrillation  Hyperthyroidism

## 2023-10-23 NOTE — ED PROVIDER NOTE - CLINICAL SUMMARY MEDICAL DECISION MAKING FREE TEXT BOX
39 yo healthy female presents for evaluation of acute sustained palpitations. Patient found to be in rapid atrial fibrillation. Will obtain labs, EKG, CXR, and dose of antiarrythmic. 39 yo healthy female presents for evaluation of acute sustained palpitations. Patient found to be in rapid atrial fibrillation. Will obtain labs, EKG, CXR, and dose of antiarrythmic. After one dose or Cardizem patient immediately converted to sinus rhythm

## 2023-10-23 NOTE — ED ADULT TRIAGE NOTE - CHIEF COMPLAINT QUOTE
Pt states was feeling well and at the gym when all of a sudden started to feel nauseous with chest tightness and generalized weakness. Pt noted to be in irregular heart rhythm with a rate from 40's to 70's.

## 2023-10-23 NOTE — ED ADULT NURSE REASSESSMENT NOTE - NS ED NURSE REASSESS COMMENT FT1
Pt  while going to the bathroom on bedside commode. Pt reporting palpitations/lightheadedness at time of using commode. Repeat EKG performed. Medicated as per orders. Pt placed back into bed and HR returned to normal limits 76. MD Coombs made aware.

## 2023-11-02 ENCOUNTER — NON-APPOINTMENT (OUTPATIENT)
Age: 38
End: 2023-11-02

## 2024-01-02 DIAGNOSIS — Z90.3 ACQUIRED ABSENCE OF STOMACH [PART OF]: ICD-10-CM

## 2024-01-04 ENCOUNTER — APPOINTMENT (OUTPATIENT)
Dept: SURGERY | Facility: CLINIC | Age: 39
End: 2024-01-04
Payer: COMMERCIAL

## 2024-01-04 ENCOUNTER — LABORATORY RESULT (OUTPATIENT)
Age: 39
End: 2024-01-04

## 2024-01-04 VITALS
BODY MASS INDEX: 33.66 KG/M2 | WEIGHT: 190 LBS | HEIGHT: 63 IN | OXYGEN SATURATION: 98 % | HEART RATE: 67 BPM | TEMPERATURE: 98.1 F | SYSTOLIC BLOOD PRESSURE: 123 MMHG | RESPIRATION RATE: 16 BRPM | DIASTOLIC BLOOD PRESSURE: 90 MMHG

## 2024-01-04 PROCEDURE — 99214 OFFICE O/P EST MOD 30 MIN: CPT

## 2024-01-04 NOTE — PHYSICAL EXAM
[Normal] : affect appropriate [de-identified] : soft, nontender. no rebound or guarding.  incisions healed

## 2024-01-04 NOTE — HISTORY OF PRESENT ILLNESS
[de-identified] :  Procedure Details: Robotic sleeve gastrectomy and hiatal hernia repair  Procedure Date: 1/10/23 Surgeon Name: Dr. Rodriguez     Post Operative: 1 year     Diet: Tolerating regular diet  Activities: cardio and weight lifting  Taking adequate PO water and protein

## 2024-01-04 NOTE — PLAN
[FreeTextEntry1] : Annual labs ordered  Recommend continue 1-to-1 sessions with registered dietitian  Maintain 64 oz water/fluid intake and >75g protein intake per day. Pt understands and agrees  Maintain activity level / exercise  Return to office in 1 year

## 2024-01-05 DIAGNOSIS — E55.9 VITAMIN D DEFICIENCY, UNSPECIFIED: ICD-10-CM

## 2024-01-05 LAB
25(OH)D3 SERPL-MCNC: 17.5 NG/ML
ALBUMIN SERPL ELPH-MCNC: 4.5 G/DL
ALP BLD-CCNC: 96 U/L
ALT SERPL-CCNC: 6 U/L
ANION GAP SERPL CALC-SCNC: 13 MMOL/L
AST SERPL-CCNC: 11 U/L
BASOPHILS # BLD AUTO: 0.02 K/UL
BASOPHILS NFR BLD AUTO: 0.4 %
BILIRUB DIRECT SERPL-MCNC: 0.1 MG/DL
BILIRUB INDIRECT SERPL-MCNC: 0.2 MG/DL
BILIRUB SERPL-MCNC: 0.2 MG/DL
BUN SERPL-MCNC: 13 MG/DL
CALCIUM SERPL-MCNC: 9.6 MG/DL
CALCIUM SERPL-MCNC: 9.6 MG/DL
CHLORIDE SERPL-SCNC: 105 MMOL/L
CHOLEST SERPL-MCNC: 205 MG/DL
CO2 SERPL-SCNC: 24 MMOL/L
CREAT SERPL-MCNC: 0.62 MG/DL
EGFR: 118 ML/MIN/1.73M2
EOSINOPHIL # BLD AUTO: 0.07 K/UL
EOSINOPHIL NFR BLD AUTO: 1.5 %
ESTIMATED AVERAGE GLUCOSE: 97 MG/DL
FERRITIN SERPL-MCNC: 27 NG/ML
FOLATE SERPL-MCNC: 8.9 NG/ML
GLUCOSE SERPL-MCNC: 93 MG/DL
HBA1C MFR BLD HPLC: 5 %
HCT VFR BLD CALC: 42.3 %
HDLC SERPL-MCNC: 37 MG/DL
HGB BLD-MCNC: 13.3 G/DL
IMM GRANULOCYTES NFR BLD AUTO: 0.2 %
IRON SATN MFR SERPL: 18 %
IRON SERPL-MCNC: 74 UG/DL
LDLC SERPL CALC-MCNC: 148 MG/DL
LYMPHOCYTES # BLD AUTO: 1.71 K/UL
LYMPHOCYTES NFR BLD AUTO: 36.5 %
MAGNESIUM SERPL-MCNC: 2 MG/DL
MAN DIFF?: NORMAL
MCHC RBC-ENTMCNC: 28.9 PG
MCHC RBC-ENTMCNC: 31.4 GM/DL
MCV RBC AUTO: 91.8 FL
MONOCYTES # BLD AUTO: 0.16 K/UL
MONOCYTES NFR BLD AUTO: 3.4 %
NEUTROPHILS # BLD AUTO: 2.71 K/UL
NEUTROPHILS NFR BLD AUTO: 58 %
NONHDLC SERPL-MCNC: 168 MG/DL
PARATHYROID HORMONE INTACT: 51 PG/ML
PHOSPHATE SERPL-MCNC: 3.9 MG/DL
PLATELET # BLD AUTO: 373 K/UL
POTASSIUM SERPL-SCNC: 4.4 MMOL/L
PREALB SERPL NEPH-MCNC: 19 MG/DL
PROT SERPL-MCNC: 7.9 G/DL
RBC # BLD: 4.61 M/UL
RBC # FLD: 12.8 %
SODIUM SERPL-SCNC: 142 MMOL/L
T4 SERPL-MCNC: 8.3 UG/DL
TIBC SERPL-MCNC: 402 UG/DL
TRIGL SERPL-MCNC: 114 MG/DL
TSH SERPL-ACNC: 2.33 UIU/ML
UIBC SERPL-MCNC: 329 UG/DL
VIT B12 SERPL-MCNC: 698 PG/ML
WBC # FLD AUTO: 4.68 K/UL

## 2024-01-05 RX ORDER — ERGOCALCIFEROL 1.25 MG/1
50000 CAPSULE ORAL
Qty: 12 | Refills: 0 | Status: ACTIVE | COMMUNITY
Start: 2024-01-05 | End: 1900-01-01

## 2024-01-08 LAB
A-TOCOPHEROL VIT E SERPL-MCNC: 10.1 MG/L
BETA+GAMMA TOCOPHEROL SERPL-MCNC: 1.5 MG/L
CA-I SERPL-SCNC: 5 MG/DL
VIT A SERPL-MCNC: 30 UG/DL

## 2024-01-09 LAB
MENADIONE SERPL-MCNC: 0.16 NG/ML
VIT C SERPL-MCNC: 0.5 MG/DL

## 2024-01-10 LAB
COPPER SERPL-MCNC: 127 UG/DL
VIT B6 SERPL-MCNC: 3.7 UG/L
ZINC SERPL-MCNC: 94 UG/DL

## 2024-01-11 LAB — VIT B1 SERPL-MCNC: 92.3 NMOL/L

## 2024-01-12 LAB — VIT B2 SERPL-MCNC: 297 UG/L

## 2024-04-22 ENCOUNTER — NON-APPOINTMENT (OUTPATIENT)
Age: 39
End: 2024-04-22

## 2024-05-10 NOTE — ED ADULT NURSE NOTE - CINV DISCH TEACH PARTICIP
Reported left lower abdominal pain since Sunday.  Pain radiates to back, also reported , saw PMD 2 days ago and ws given miralax thought might be constipation.  Yesterday had diarrhea and noted blood in stool but since then no BM. Pt on eliquis.  Denies frequent/burning urination.  Denies nausea/vomiting.  Hx of diverticulitis yrs ago   Patient

## 2024-05-29 ENCOUNTER — APPOINTMENT (OUTPATIENT)
Dept: DERMATOLOGY | Facility: CLINIC | Age: 39
End: 2024-05-29
Payer: COMMERCIAL

## 2024-05-29 PROCEDURE — 99214 OFFICE O/P EST MOD 30 MIN: CPT

## 2024-06-06 ENCOUNTER — NON-APPOINTMENT (OUTPATIENT)
Age: 39
End: 2024-06-06

## 2024-07-09 ENCOUNTER — APPOINTMENT (OUTPATIENT)
Dept: FAMILY MEDICINE | Facility: CLINIC | Age: 39
End: 2024-07-09

## 2024-07-09 VITALS
SYSTOLIC BLOOD PRESSURE: 126 MMHG | WEIGHT: 205 LBS | DIASTOLIC BLOOD PRESSURE: 80 MMHG | OXYGEN SATURATION: 98 % | HEIGHT: 63 IN | HEART RATE: 75 BPM | BODY MASS INDEX: 36.32 KG/M2

## 2024-07-09 DIAGNOSIS — Z63.5 DISRUPTION OF FAMILY BY SEPARATION AND DIVORCE: ICD-10-CM

## 2024-07-09 DIAGNOSIS — Z00.00 ENCOUNTER FOR GENERAL ADULT MEDICAL EXAMINATION W/OUT ABNORMAL FINDINGS: ICD-10-CM

## 2024-07-09 PROCEDURE — 99395 PREV VISIT EST AGE 18-39: CPT

## 2024-07-09 SDOH — SOCIAL STABILITY - SOCIAL INSECURITY: DISRUPTION OF FAMILY BY SEPARATION AND DIVORCE: Z63.5

## 2024-07-27 ENCOUNTER — NON-APPOINTMENT (OUTPATIENT)
Age: 39
End: 2024-07-27

## 2024-08-01 ENCOUNTER — APPOINTMENT (OUTPATIENT)
Dept: OBGYN | Facility: CLINIC | Age: 39
End: 2024-08-01
Payer: COMMERCIAL

## 2024-08-01 VITALS
SYSTOLIC BLOOD PRESSURE: 110 MMHG | DIASTOLIC BLOOD PRESSURE: 70 MMHG | BODY MASS INDEX: 36.5 KG/M2 | WEIGHT: 206 LBS | HEIGHT: 63 IN

## 2024-08-01 DIAGNOSIS — Z30.09 ENCOUNTER FOR OTHER GENERAL COUNSELING AND ADVICE ON CONTRACEPTION: ICD-10-CM

## 2024-08-01 DIAGNOSIS — Z12.4 ENCOUNTER FOR SCREENING FOR MALIGNANT NEOPLASM OF CERVIX: ICD-10-CM

## 2024-08-01 DIAGNOSIS — Z83.3 FAMILY HISTORY OF DIABETES MELLITUS: ICD-10-CM

## 2024-08-01 DIAGNOSIS — Z82.49 FAMILY HISTORY OF ISCHEMIC HEART DISEASE AND OTHER DISEASES OF THE CIRCULATORY SYSTEM: ICD-10-CM

## 2024-08-01 DIAGNOSIS — Z11.3 ENCOUNTER FOR SCREENING FOR INFECTIONS WITH A PREDOMINANTLY SEXUAL MODE OF TRANSMISSION: ICD-10-CM

## 2024-08-01 PROCEDURE — 99385 PREV VISIT NEW AGE 18-39: CPT

## 2024-08-01 PROCEDURE — 99459 PELVIC EXAMINATION: CPT

## 2024-08-01 RX ORDER — NORELGESTROMIN AND ETHINYL ESTRADIOL 150; 35 UG/D; UG/D
150-35 PATCH TRANSDERMAL
Qty: 13 | Refills: 0 | Status: ACTIVE | COMMUNITY
Start: 2024-08-01 | End: 1900-01-01

## 2024-08-01 NOTE — HISTORY OF PRESENT ILLNESS
[N] : Patient does not use contraception [Y] : Positive pregnancy history [Regular Cycle Intervals] : periods have been regular [Frequency: Q ___ days] : menstrual periods occur approximately every [unfilled] days [Menarche Age: ____] : age at menarche was [unfilled] [FreeTextEntry1] : 39 y.o  (LMP 2024) presents for gyn annual   Overall doing well since last visit in  with Dr. Man.  Recently , and now has new sexual partner.  Interested in various forms of birth control.  Previously used IUD-did not tolerate well due to abnormal bleeding.  Was previously on birth control patches, which she tolerated well and would like to retry.  Underwent gastric sleeve in the last year, with improved menses.  Reports regular cycles every month.  Denies any heavy bleeding, pelvic pain or urinary complaints  Screening: - Pap (2020): NILM, HPV neg   [PGHxTotal] : 3 [[facility]] : 2 [PGHxPremature] : 0 [PGHxAbortions] : 1 [[facility]Living] : 2 [PGHxABInduced] : 0 [PGHxABSpont] : 1 [PGHxEctopic] : 0 [PGHxMultBirths] : 0

## 2024-08-01 NOTE — DISCUSSION/SUMMARY
[FreeTextEntry1] : 39 y.o  (LMP 2024) presents for gyn annual  PCP: Dr. White    #Well Woman Visit  1. Nutrition/Activity: The benefits of physical activity and balanced diet.  2. Health Screening: She was informed of the benefits of a screening Pap. - Cervix: We reviewed ASCCP/ACOG guidelines for pap smear screening. PAP collected today. 3. Sex Health: The importance of safe-sex practices was discussed with the patient. STD screening was offered to patient, she accepts g/c testing 4. Denies any hx of DM/HTN    #Contraception - she verbalized understanding and desire for contraceptive patch  - common side effects were reviewed  - discussed typical effectiveness rates of 91%  - counseled about the reduced contraceptive effectiveness if doses are missed and the recommendation to use condom protection for 1 week after the missed dose - counseled on the risk of blood clot and stroke, but that the risk of stroke from pregnancy outweighs that of patch - she denies history of blood clots, hypertension, CVA, migraine with aura, breast cancer, liver disease, gall bladder disease, and family history of first degree relative with VTE or CVA or GYN malignancy  - denies hx of or current tobacco use  - she is aware that patch does not protect against sexually transmitted diseases and she was encouraged to use condoms with her contraceptive if she is at risk for an STD  - she was also told to call with any problematic side-effects to discuss management options or changing to another contraceptive method prior to discontinuing  - Xulane patch sent to pharmacy   She verbalized understanding and agreement with above counseling regarding differential diagnosis, evaluation, and plan. She was given time for questions/concerns which were all answered to her apparent satisfaction.  All designated lab work for today drawn in office.   RTO in 3 months for follow up

## 2024-08-01 NOTE — PHYSICAL EXAM
[Chaperone Present] : A chaperone was present in the examining room during all aspects of the physical examination [[id number]] : A chaperone was present during the pelvic exam. [Appropriately responsive] : appropriately responsive [Alert] : alert [No Acute Distress] : no acute distress [Soft] : soft [Non-tender] : non-tender [No Lesions] : no lesions [No Mass] : no mass [Oriented x3] : oriented x3 [Breast Palpation Diffuse Fibrous Tissue Bilateral] : fibrocystic changes [No Masses] : no breast masses were palpable [Labia Majora] : normal [Labia Minora] : normal [Normal] : normal [Uterine Adnexae] : normal

## 2024-08-11 LAB
C TRACH RRNA SPEC QL NAA+PROBE: NOT DETECTED
CYTOLOGY CVX/VAG DOC THIN PREP: NORMAL
HPV HIGH+LOW RISK DNA PNL CVX: NOT DETECTED
N GONORRHOEA RRNA SPEC QL NAA+PROBE: NOT DETECTED
SOURCE TP AMPLIFICATION: NORMAL

## 2024-08-15 ENCOUNTER — APPOINTMENT (OUTPATIENT)
Dept: SURGERY | Facility: CLINIC | Age: 39
End: 2024-08-15
Payer: COMMERCIAL

## 2024-08-15 VITALS
WEIGHT: 203.4 LBS | SYSTOLIC BLOOD PRESSURE: 108 MMHG | OXYGEN SATURATION: 99 % | BODY MASS INDEX: 36.04 KG/M2 | DIASTOLIC BLOOD PRESSURE: 74 MMHG | TEMPERATURE: 97.6 F | HEART RATE: 61 BPM | RESPIRATION RATE: 16 BRPM | HEIGHT: 63 IN

## 2024-08-15 DIAGNOSIS — Z90.3 ACQUIRED ABSENCE OF STOMACH [PART OF]: ICD-10-CM

## 2024-08-15 DIAGNOSIS — E66.01 MORBID (SEVERE) OBESITY DUE TO EXCESS CALORIES: ICD-10-CM

## 2024-08-15 PROCEDURE — 99214 OFFICE O/P EST MOD 30 MIN: CPT

## 2024-08-15 NOTE — PLAN
[FreeTextEntry1] :  Recommend continue 1-to-1 sessions with registered dietitian  Maintain  64 oz water/fluid intake and >75g protein intake per day. Pt understands and agrees  Maintain activity level / exercise  Return to office for annual appointment

## 2024-08-15 NOTE — PHYSICAL EXAM
[Normal] : affect appropriate [de-identified] : soft, nontender. no rebound or guarding.  incisions healed.

## 2024-08-15 NOTE — HISTORY OF PRESENT ILLNESS
[de-identified] : Procedure Details: Robotic sleeve gastrectomy and hiatal hernia repair Procedure Date: 1/10/23 Surgeon Name: Dr. Rodriguez   Post Operative: 1 year 8 months   Denies nausea, vomiting, diarrhea, constipation or reflux.  Diet: Tolerating regular diet Activities: cardio and weight lifting 3x per week Taking adequate PO water and protein.

## 2024-08-15 NOTE — ASSESSMENT
[FreeTextEntry1] : Previous visit weight: 190 lbs Today's weight: 203.4 lbs Today's BMI: 36.0  Patient had 13 lb weight gain since last visit. Patient states she has been adhering to small portions high in protein, 64 oz of water per day with cardio/weightlifting 3x per week.  Patient is open to exploring options for medical weight loss management.

## 2024-09-25 ENCOUNTER — APPOINTMENT (OUTPATIENT)
Dept: OBGYN | Facility: CLINIC | Age: 39
End: 2024-09-25

## 2024-09-25 VITALS
BODY MASS INDEX: 36.32 KG/M2 | SYSTOLIC BLOOD PRESSURE: 126 MMHG | HEIGHT: 63 IN | DIASTOLIC BLOOD PRESSURE: 78 MMHG | WEIGHT: 205 LBS

## 2024-09-25 PROCEDURE — 99213 OFFICE O/P EST LOW 20 MIN: CPT

## 2024-09-25 RX ORDER — PHENTERMINE HYDROCHLORIDE 15 MG/1
15 CAPSULE ORAL
Qty: 30 | Refills: 0 | Status: ACTIVE | COMMUNITY
Start: 2024-09-25 | End: 1900-01-01

## 2024-09-25 NOTE — HISTORY OF PRESENT ILLNESS
[FreeTextEntry2] : 190 [FreeTextEntry3] : 927 [FreeTextEntry1] : ANDER ZAMUDIO is a 39 year year old who comes in for a dietary/weight loss consultation. Past medical history is significant for obesity, fatty liver,  Patient's vital signs were reviewed. Her reported height is 5'3. Today's weight is 205 . BMI is 36.3. A detailed weight history was obtained. Patient had surgery with Dr. Rodriguez - gastric sleeve in Jan 2023. Patients start weight was 260 and got to her lowest weight of 190.    The patient has tried numerous weight loss programs including self-directed and physician supervised diets and self-directed exercise programs. She has lost greater than 10 pounds on more than one occasion, however, has experienced weight regain despite her best efforts with healthy diet and exercise.  LABS - DATE 1/5/24 HgA1C: 5.0%       DIETING HISTORY Prior Diets: cutting calories, weight watchers History of Bariatric Surgery: YES If yes, what Bariatric Surgery: gastric sleeve 2023 Interest in Bariatric surgery:   History of Weight Loss Medications: None If yes, what Medications: Complications & Side Effects of Anti-Obesity Medications:  LIFESTYLE: Contraception: none, stopped the patch Sleep: 8 hours  Alcohol: none Exercise : 3x/week, cardio and weights, 1.5 hours Occupation:[employer]   NUTRITION: Breakfast: 630am egg and cheese wrap in AM, iced coffee/latte, water Lunch: 11-12pm eggs, chicken with lettuce/tomatoe, fruit Dinner: 530-6pm chicken w/ salad, mashed potatoes, meat, rare bread and rice.  Snacks:fruit, banana, figs,strawberries Drinks: water,    I have instructed the patient to follow a 1200 calories meal plan emphasizing low saturated fat sources with moderate amount of sodium as well. Information on fast food eating was supplied and additional information on low fat eating was also supplied. Suggestions of meals and snacks were discussed with the patient in great detail. We reviewed the efforts of weight reduction identifying 3500 calories in pound of body fat and the need to gradually and sloqly chip away at this number on a long term basis for weight reduction. It is a lifestyle change. WE discussed the need to reduce calories for what her current patterns are and to hopefully increase physical activity as well.     She does emphasize a lot of fruit and vegetables, trying tot get fruits and vegetables or both at most meals. She also is emphasizing lower fat selections.       Patient reports old habits as large portion sizes and eating past the point of satisfied as obstacles to weight control. Patient admits to eating quickly, skipping meals, and poor food choices. Discussed the importance of a balanced, healthy diet of nourishing foods and consistent meal pattern for long-term wt loss success and health maintenance. Pt educated on eating 4-6 small meals per day, that are high in protein for hunger regulation. Pt educated on cutting out high-sugar content foods sabotaging wt loss. Discussed how carbonated beverages and high-sugar content foods may be poorly tolerated post-operatively. Pt verbalized understanding and states She will cut out high-sugar content foods and increase water intake to facilitate adequate hydration.     Her physical activity is minimal at this time. Recommendations given to the patient to increase the intensity and the duration of her physical activity with the goal of 30mins five times a week. to starty with brisk walking. Recommend the patient to reduce calories by 500 daily to support a weight loss of 1 pound a week. This translated into a 1200 calorie plan. I encouraged the patient to keep food records in order to better track calorie consumed. I recommended low fat selections and especially those that are lower in saturated fats. Emphasis would be placed on monitoring portions of meat and having more moderate snacks between meal as well.      Will try Course of :    Nutrition reconsult with [facility] Rm Rx sent. Risks include but are not limited to nausea, vomiting, constipation, diarrhea, and Gi upset. Contraindications include Pancreatitis, MENS type 2 and medullary thyroid cancer, and pregnancy. Pt. verbalize understanding and wishes to proceed with medical therapy. Instructions for use provided via office demonstration. Discussed shortage and insurance limitations.

## 2024-09-27 ENCOUNTER — APPOINTMENT (OUTPATIENT)
Dept: OBGYN | Facility: CLINIC | Age: 39
End: 2024-09-27
Payer: COMMERCIAL

## 2024-09-27 VITALS
DIASTOLIC BLOOD PRESSURE: 80 MMHG | HEIGHT: 63 IN | SYSTOLIC BLOOD PRESSURE: 120 MMHG | WEIGHT: 206.13 LBS | BODY MASS INDEX: 36.52 KG/M2

## 2024-09-27 DIAGNOSIS — Z30.09 ENCOUNTER FOR OTHER GENERAL COUNSELING AND ADVICE ON CONTRACEPTION: ICD-10-CM

## 2024-09-27 PROCEDURE — 99213 OFFICE O/P EST LOW 20 MIN: CPT

## 2024-09-27 PROCEDURE — 81025 URINE PREGNANCY TEST: CPT

## 2024-09-27 NOTE — DISCUSSION/SUMMARY
[FreeTextEntry1] : 39 y.o  (LMP 2024) presents for contraception discussion    Plan: - Discussed alternative contraceptive options. Patient would like to try hormonal IUD. Risk/benefits/side effect profile discussed. Patient would like to try until  mirena IUD - Discussed condom use until placement and one week after  RTO for mirena

## 2024-09-27 NOTE — HISTORY OF PRESENT ILLNESS
[FreeTextEntry1] : 39 y.o  (LMP 2024) presents for contraception discussion   Patient states that she tried using the patch, but it kept falling off. Would like to try hormonal IUD option No additional complaints Started following with obesity medicine.

## 2024-09-29 LAB
HCG UR QL: NEGATIVE
QUALITY CONTROL: YES

## 2024-11-01 ENCOUNTER — APPOINTMENT (OUTPATIENT)
Dept: OBGYN | Facility: CLINIC | Age: 39
End: 2024-11-01
Payer: COMMERCIAL

## 2024-11-01 VITALS
DIASTOLIC BLOOD PRESSURE: 84 MMHG | HEIGHT: 63 IN | BODY MASS INDEX: 36.35 KG/M2 | SYSTOLIC BLOOD PRESSURE: 114 MMHG | WEIGHT: 205.13 LBS

## 2024-11-01 DIAGNOSIS — E66.9 OBESITY, UNSPECIFIED: ICD-10-CM

## 2024-11-01 DIAGNOSIS — Z90.3 ACQUIRED ABSENCE OF STOMACH [PART OF]: ICD-10-CM

## 2024-11-01 PROCEDURE — 99214 OFFICE O/P EST MOD 30 MIN: CPT

## 2024-11-01 RX ORDER — PHENTERMINE HYDROCHLORIDE 37.5 MG/1
37.5 CAPSULE ORAL
Qty: 30 | Refills: 0 | Status: ACTIVE | COMMUNITY
Start: 2024-11-01 | End: 1900-01-01

## 2024-11-04 ENCOUNTER — NON-APPOINTMENT (OUTPATIENT)
Age: 39
End: 2024-11-04

## 2024-11-04 RX ORDER — SEMAGLUTIDE 0.68 MG/ML
2 INJECTION, SOLUTION SUBCUTANEOUS
Qty: 1 | Refills: 2 | Status: ACTIVE | COMMUNITY
Start: 2024-11-01

## 2024-11-06 ENCOUNTER — NON-APPOINTMENT (OUTPATIENT)
Age: 39
End: 2024-11-06

## 2024-11-07 ENCOUNTER — APPOINTMENT (OUTPATIENT)
Dept: OBGYN | Facility: CLINIC | Age: 39
End: 2024-11-07
Payer: COMMERCIAL

## 2024-11-07 VITALS
BODY MASS INDEX: 36.06 KG/M2 | WEIGHT: 203.5 LBS | HEIGHT: 63 IN | DIASTOLIC BLOOD PRESSURE: 80 MMHG | SYSTOLIC BLOOD PRESSURE: 122 MMHG

## 2024-11-07 DIAGNOSIS — Z30.430 ENCOUNTER FOR INSERTION OF INTRAUTERINE CONTRACEPTIVE DEVICE: ICD-10-CM

## 2024-11-07 LAB
HCG UR QL: NEGATIVE
QUALITY CONTROL: YES

## 2024-11-07 PROCEDURE — 58300 INSERT INTRAUTERINE DEVICE: CPT

## 2024-11-07 PROCEDURE — 81025 URINE PREGNANCY TEST: CPT

## 2024-12-16 ENCOUNTER — NON-APPOINTMENT (OUTPATIENT)
Age: 39
End: 2024-12-16

## 2025-01-06 ENCOUNTER — APPOINTMENT (OUTPATIENT)
Dept: OBGYN | Facility: CLINIC | Age: 40
End: 2025-01-06
Payer: COMMERCIAL

## 2025-01-06 VITALS
HEIGHT: 63 IN | BODY MASS INDEX: 36.59 KG/M2 | SYSTOLIC BLOOD PRESSURE: 100 MMHG | WEIGHT: 206.5 LBS | DIASTOLIC BLOOD PRESSURE: 70 MMHG

## 2025-01-06 DIAGNOSIS — Z97.5 PRESENCE OF (INTRAUTERINE) CONTRACEPTIVE DEVICE: ICD-10-CM

## 2025-01-06 DIAGNOSIS — N89.8 OTHER SPECIFIED NONINFLAMMATORY DISORDERS OF VAGINA: ICD-10-CM

## 2025-01-06 PROCEDURE — 99213 OFFICE O/P EST LOW 20 MIN: CPT

## 2025-01-06 PROCEDURE — 99459 PELVIC EXAMINATION: CPT

## 2025-01-06 RX ORDER — METRONIDAZOLE 7.5 MG/G
0.75 GEL VAGINAL
Qty: 1 | Refills: 0 | Status: ACTIVE | COMMUNITY
Start: 2025-01-06 | End: 1900-01-01

## 2025-01-07 PROBLEM — Z97.5 IUD (INTRAUTERINE DEVICE) IN PLACE: Status: ACTIVE | Noted: 2025-01-07

## 2025-01-16 ENCOUNTER — APPOINTMENT (OUTPATIENT)
Dept: SURGERY | Facility: CLINIC | Age: 40
End: 2025-01-16
Payer: COMMERCIAL

## 2025-01-16 ENCOUNTER — NON-APPOINTMENT (OUTPATIENT)
Age: 40
End: 2025-01-16

## 2025-01-16 VITALS
HEIGHT: 63 IN | TEMPERATURE: 98.2 F | RESPIRATION RATE: 16 BRPM | DIASTOLIC BLOOD PRESSURE: 82 MMHG | HEART RATE: 81 BPM | SYSTOLIC BLOOD PRESSURE: 126 MMHG | WEIGHT: 202.8 LBS | BODY MASS INDEX: 35.93 KG/M2 | OXYGEN SATURATION: 98 %

## 2025-01-16 DIAGNOSIS — Z98.84 BARIATRIC SURGERY STATUS: ICD-10-CM

## 2025-01-16 PROCEDURE — 99214 OFFICE O/P EST MOD 30 MIN: CPT

## 2025-02-07 ENCOUNTER — APPOINTMENT (OUTPATIENT)
Dept: OBGYN | Facility: CLINIC | Age: 40
End: 2025-02-07

## 2025-02-21 ENCOUNTER — APPOINTMENT (OUTPATIENT)
Dept: OBGYN | Facility: CLINIC | Age: 40
End: 2025-02-21

## 2025-03-25 ENCOUNTER — NON-APPOINTMENT (OUTPATIENT)
Age: 40
End: 2025-03-25

## 2025-04-28 ENCOUNTER — NON-APPOINTMENT (OUTPATIENT)
Age: 40
End: 2025-04-28

## 2025-05-20 NOTE — PATIENT PROFILE ADULT - NSPROPTRIGHTBILLOFRIGHTS_GEN_A_NUR
DATE OF SERVICE: 05-20-25 @ 10:09    Patient is a 71y old  Female who presents with a chief complaint of     SUBJECTIVE / OVERNIGHT EVENTS: had abd pain all night    MEDICATIONS  (STANDING):  aspirin enteric coated 81 milliGRAM(s) Oral daily  dextrose 5%. 1000 milliLiter(s) (50 mL/Hr) IV Continuous <Continuous>  dextrose 5%. 1000 milliLiter(s) (100 mL/Hr) IV Continuous <Continuous>  dextrose 50% Injectable 25 Gram(s) IV Push once  dextrose 50% Injectable 12.5 Gram(s) IV Push once  dextrose 50% Injectable 25 Gram(s) IV Push once  ezetimibe 10 milliGRAM(s) Oral daily  gabapentin 100 milliGRAM(s) Oral daily  glucagon  Injectable 1 milliGRAM(s) IntraMuscular once  heparin   Injectable 5000 Unit(s) SubCutaneous every 8 hours  insulin lispro (ADMELOG) corrective regimen sliding scale   SubCutaneous three times a day before meals  insulin lispro (ADMELOG) corrective regimen sliding scale   SubCutaneous at bedtime  ketotifen 0.025% Ophthalmic Solution 1 Drop(s) Both EYES two times a day  levETIRAcetam 500 milliGRAM(s) Oral two times a day  metoprolol succinate ER 12.5 milliGRAM(s) Oral daily  pantoprazole    Tablet 40 milliGRAM(s) Oral before breakfast  rosuvastatin 20 milliGRAM(s) Oral at bedtime  sodium chloride 0.9%. 1000 milliLiter(s) (75 mL/Hr) IV Continuous <Continuous>    MEDICATIONS  (PRN):  dextrose Oral Gel 15 Gram(s) Oral once PRN Blood Glucose LESS THAN 70 milliGRAM(s)/deciliter      Vital Signs Last 24 Hrs  T(C): 36.5 (20 May 2025 04:35), Max: 36.7 (19 May 2025 12:47)  T(F): 97.7 (20 May 2025 04:35), Max: 98.1 (19 May 2025 17:19)  HR: 86 (20 May 2025 04:35) (72 - 86)  BP: 134/72 (20 May 2025 04:35) (120/69 - 150/79)  BP(mean): --  RR: 16 (20 May 2025 04:35) (16 - 18)  SpO2: 94% (20 May 2025 04:35) (94% - 98%)    Parameters below as of 20 May 2025 04:35  Patient On (Oxygen Delivery Method): room air      CAPILLARY BLOOD GLUCOSE      POCT Blood Glucose.: 336 mg/dL (20 May 2025 08:07)  POCT Blood Glucose.: 254 mg/dL (19 May 2025 20:53)  POCT Blood Glucose.: 106 mg/dL (19 May 2025 17:36)  POCT Blood Glucose.: 205 mg/dL (19 May 2025 12:48)    I&O's Summary    19 May 2025 07:01  -  20 May 2025 07:00  --------------------------------------------------------  IN: 750 mL / OUT: 0 mL / NET: 750 mL        PHYSICAL EXAM:  GENERAL: NAD, well-developed  HEAD:  Atraumatic, Normocephalic  EYES: EOMI, PERRLA, conjunctiva and sclera clear  NECK: Supple, No JVD  CHEST/LUNG: Clear to auscultation bilaterally; No wheeze  HEART: Regular rate and rhythm; No murmurs, rubs, or gallops  ABDOMEN: Soft, tender, Nondistended; Bowel sounds present  EXTREMITIES:  2+ Peripheral Pulses, No clubbing, cyanosis, or edema  PSYCH: AAOx3  NEUROLOGY: non-focal  SKIN: No rashes or lesions    LABS:                        13.4   7.10  )-----------( 254      ( 20 May 2025 05:42 )             39.7     05-20    128[L]  |  90[L]  |  10  ----------------------------<  421[H]  4.3   |  17[L]  |  0.60    Ca    9.2      20 May 2025 05:42  Phos  2.6     05-20  Mg     1.9     05-20    TPro  7.0  /  Alb  4.0  /  TBili  0.5  /  DBili  0.2  /  AST  22  /  ALT  18  /  AlkPhos  97  05-20          Urinalysis Basic - ( 20 May 2025 05:42 )    Color: x / Appearance: x / SG: x / pH: x  Gluc: 421 mg/dL / Ketone: x  / Bili: x / Urobili: x   Blood: x / Protein: x / Nitrite: x   Leuk Esterase: x / RBC: x / WBC x   Sq Epi: x / Non Sq Epi: x / Bacteria: x        RADIOLOGY & ADDITIONAL TESTS:    Imaging Personally Reviewed:    Consultant(s) Notes Reviewed:      Care Discussed with Consultants/Other Providers:   patient

## 2025-05-23 ENCOUNTER — APPOINTMENT (OUTPATIENT)
Dept: OBGYN | Facility: CLINIC | Age: 40
End: 2025-05-23
Payer: COMMERCIAL

## 2025-05-23 VITALS — HEIGHT: 63 IN

## 2025-05-23 DIAGNOSIS — Z98.84 BARIATRIC SURGERY STATUS: ICD-10-CM

## 2025-05-23 DIAGNOSIS — E66.9 OBESITY, UNSPECIFIED: ICD-10-CM

## 2025-05-23 PROCEDURE — 99215 OFFICE O/P EST HI 40 MIN: CPT | Mod: 95

## 2025-05-27 ENCOUNTER — NON-APPOINTMENT (OUTPATIENT)
Age: 40
End: 2025-05-27

## 2025-05-30 RX ORDER — SEMAGLUTIDE 0.25 MG/.5ML
0.25 INJECTION, SOLUTION SUBCUTANEOUS
Qty: 1 | Refills: 0 | Status: ACTIVE | COMMUNITY
Start: 2025-05-23

## 2025-06-02 ENCOUNTER — NON-APPOINTMENT (OUTPATIENT)
Age: 40
End: 2025-06-02

## 2025-06-16 NOTE — PLAN
Patient arrived to Infusion Center for IVIG. Assessment completed.  No needs voiced at this time. Patient tolerated infusion well and is aware of next appointment on 7/7/25 @1015.  Patient discharged ambulatory with spouse.   
[FreeTextEntry1] : UCG: NEGATIVE\par WILL CHECK MRI BRAIN \par CHECK LYME, ESR, ESTHER\par NEUROLOGY EVALUATION\par RECENT LAB WORK REVIEWED\par RX VITAMIN D\par HEALTHY DIET AND LIFESTYLE MODIFICATIONS\par HEALTHY WEIGHT LOSS\par AVOIDANCE OF TYLENOL/ETOH AND RECHECK LFT'S WITH HEPATITIS PANEL\par FURTHER RECOMMENDATIONS PENDING RESULTS\par STRESS REDUCTION EXERCISES\par THERAPY EVALUATION RECOMMENDED\par SUPPORT GIVEN\par CALL WITH ANY QUESTIONS, CONCERNS OR CHANGES

## 2025-06-24 ENCOUNTER — EMERGENCY (EMERGENCY)
Facility: HOSPITAL | Age: 40
LOS: 1 days | End: 2025-06-24
Attending: EMERGENCY MEDICINE
Payer: COMMERCIAL

## 2025-06-24 VITALS
RESPIRATION RATE: 16 BRPM | TEMPERATURE: 98 F | OXYGEN SATURATION: 96 % | HEART RATE: 85 BPM | DIASTOLIC BLOOD PRESSURE: 89 MMHG | SYSTOLIC BLOOD PRESSURE: 148 MMHG

## 2025-06-24 VITALS
RESPIRATION RATE: 18 BRPM | DIASTOLIC BLOOD PRESSURE: 90 MMHG | OXYGEN SATURATION: 100 % | HEART RATE: 71 BPM | SYSTOLIC BLOOD PRESSURE: 140 MMHG | TEMPERATURE: 98 F

## 2025-06-24 LAB
ALBUMIN SERPL ELPH-MCNC: 4.2 G/DL — SIGNIFICANT CHANGE UP (ref 3.3–5.2)
ALP SERPL-CCNC: 101 U/L — SIGNIFICANT CHANGE UP (ref 40–120)
ALT FLD-CCNC: 7 U/L — SIGNIFICANT CHANGE UP
ANION GAP SERPL CALC-SCNC: 16 MMOL/L — SIGNIFICANT CHANGE UP (ref 5–17)
AST SERPL-CCNC: 14 U/L — SIGNIFICANT CHANGE UP
BASOPHILS # BLD AUTO: 0.03 K/UL — SIGNIFICANT CHANGE UP (ref 0–0.2)
BASOPHILS NFR BLD AUTO: 0.5 % — SIGNIFICANT CHANGE UP (ref 0–2)
BILIRUB SERPL-MCNC: 0.3 MG/DL — LOW (ref 0.4–2)
BUN SERPL-MCNC: 11.9 MG/DL — SIGNIFICANT CHANGE UP (ref 8–20)
CALCIUM SERPL-MCNC: 9.3 MG/DL — SIGNIFICANT CHANGE UP (ref 8.4–10.5)
CHLORIDE SERPL-SCNC: 101 MMOL/L — SIGNIFICANT CHANGE UP (ref 96–108)
CO2 SERPL-SCNC: 20 MMOL/L — LOW (ref 22–29)
CREAT SERPL-MCNC: 0.67 MG/DL — SIGNIFICANT CHANGE UP (ref 0.5–1.3)
EGFR: 114 ML/MIN/1.73M2 — SIGNIFICANT CHANGE UP
EGFR: 114 ML/MIN/1.73M2 — SIGNIFICANT CHANGE UP
EOSINOPHIL # BLD AUTO: 0.08 K/UL — SIGNIFICANT CHANGE UP (ref 0–0.5)
EOSINOPHIL NFR BLD AUTO: 1.3 % — SIGNIFICANT CHANGE UP (ref 0–6)
GLUCOSE SERPL-MCNC: 79 MG/DL — SIGNIFICANT CHANGE UP (ref 70–99)
HCG SERPL-ACNC: <4 MIU/ML — SIGNIFICANT CHANGE UP
HCT VFR BLD CALC: 38 % — SIGNIFICANT CHANGE UP (ref 34.5–45)
HGB BLD-MCNC: 12.3 G/DL — SIGNIFICANT CHANGE UP (ref 11.5–15.5)
IMM GRANULOCYTES # BLD AUTO: 0.02 K/UL — SIGNIFICANT CHANGE UP (ref 0–0.07)
IMM GRANULOCYTES NFR BLD AUTO: 0.3 % — SIGNIFICANT CHANGE UP (ref 0–0.9)
LIDOCAIN IGE QN: 63 U/L — HIGH (ref 22–51)
LYMPHOCYTES # BLD AUTO: 1.95 K/UL — SIGNIFICANT CHANGE UP (ref 1–3.3)
LYMPHOCYTES NFR BLD AUTO: 30.7 % — SIGNIFICANT CHANGE UP (ref 13–44)
MCHC RBC-ENTMCNC: 27 PG — SIGNIFICANT CHANGE UP (ref 27–34)
MCHC RBC-ENTMCNC: 32.4 G/DL — SIGNIFICANT CHANGE UP (ref 32–36)
MCV RBC AUTO: 83.3 FL — SIGNIFICANT CHANGE UP (ref 80–100)
MONOCYTES # BLD AUTO: 0.42 K/UL — SIGNIFICANT CHANGE UP (ref 0–0.9)
MONOCYTES NFR BLD AUTO: 6.6 % — SIGNIFICANT CHANGE UP (ref 2–14)
NEUTROPHILS # BLD AUTO: 3.85 K/UL — SIGNIFICANT CHANGE UP (ref 1.8–7.4)
NEUTROPHILS NFR BLD AUTO: 60.6 % — SIGNIFICANT CHANGE UP (ref 43–77)
NRBC # BLD AUTO: 0 K/UL — SIGNIFICANT CHANGE UP (ref 0–0)
NRBC # FLD: 0 K/UL — SIGNIFICANT CHANGE UP (ref 0–0)
NRBC BLD AUTO-RTO: 0 /100 WBCS — SIGNIFICANT CHANGE UP (ref 0–0)
PLATELET # BLD AUTO: 325 K/UL — SIGNIFICANT CHANGE UP (ref 150–400)
PMV BLD: 10 FL — SIGNIFICANT CHANGE UP (ref 7–13)
POTASSIUM SERPL-MCNC: 4.4 MMOL/L — SIGNIFICANT CHANGE UP (ref 3.5–5.3)
POTASSIUM SERPL-SCNC: 4.4 MMOL/L — SIGNIFICANT CHANGE UP (ref 3.5–5.3)
PROT SERPL-MCNC: 7.8 G/DL — SIGNIFICANT CHANGE UP (ref 6.6–8.7)
RBC # BLD: 4.56 M/UL — SIGNIFICANT CHANGE UP (ref 3.8–5.2)
RBC # FLD: 13.2 % — SIGNIFICANT CHANGE UP (ref 10.3–14.5)
SODIUM SERPL-SCNC: 137 MMOL/L — SIGNIFICANT CHANGE UP (ref 135–145)
TROPONIN T, HIGH SENSITIVITY RESULT: <6 NG/L — SIGNIFICANT CHANGE UP (ref 0–51)
WBC # BLD: 6.35 K/UL — SIGNIFICANT CHANGE UP (ref 3.8–10.5)
WBC # FLD AUTO: 6.35 K/UL — SIGNIFICANT CHANGE UP (ref 3.8–10.5)

## 2025-06-24 PROCEDURE — 85025 COMPLETE CBC W/AUTO DIFF WBC: CPT

## 2025-06-24 PROCEDURE — 99285 EMERGENCY DEPT VISIT HI MDM: CPT | Mod: 25

## 2025-06-24 PROCEDURE — 93005 ELECTROCARDIOGRAM TRACING: CPT

## 2025-06-24 PROCEDURE — 99284 EMERGENCY DEPT VISIT MOD MDM: CPT

## 2025-06-24 PROCEDURE — 71045 X-RAY EXAM CHEST 1 VIEW: CPT

## 2025-06-24 PROCEDURE — 93010 ELECTROCARDIOGRAM REPORT: CPT

## 2025-06-24 PROCEDURE — 36415 COLL VENOUS BLD VENIPUNCTURE: CPT

## 2025-06-24 PROCEDURE — 96374 THER/PROPH/DIAG INJ IV PUSH: CPT

## 2025-06-24 PROCEDURE — 96375 TX/PRO/DX INJ NEW DRUG ADDON: CPT

## 2025-06-24 PROCEDURE — 84484 ASSAY OF TROPONIN QUANT: CPT

## 2025-06-24 PROCEDURE — 71045 X-RAY EXAM CHEST 1 VIEW: CPT | Mod: 26

## 2025-06-24 PROCEDURE — 83690 ASSAY OF LIPASE: CPT

## 2025-06-24 PROCEDURE — 84702 CHORIONIC GONADOTROPIN TEST: CPT

## 2025-06-24 PROCEDURE — 80053 COMPREHEN METABOLIC PANEL: CPT

## 2025-06-24 RX ORDER — METOCLOPRAMIDE HCL 10 MG
10 TABLET ORAL ONCE
Refills: 0 | Status: COMPLETED | OUTPATIENT
Start: 2025-06-24 | End: 2025-06-24

## 2025-06-24 RX ORDER — ACETAMINOPHEN 500 MG/5ML
1000 LIQUID (ML) ORAL ONCE
Refills: 0 | Status: COMPLETED | OUTPATIENT
Start: 2025-06-24 | End: 2025-06-24

## 2025-06-24 RX ORDER — METOCLOPRAMIDE HCL 10 MG
10 TABLET ORAL ONCE
Refills: 0 | Status: DISCONTINUED | OUTPATIENT
Start: 2025-06-24 | End: 2025-06-24

## 2025-06-24 RX ADMIN — Medication 10 MILLIGRAM(S): at 19:56

## 2025-06-24 RX ADMIN — Medication 400 MILLIGRAM(S): at 19:56

## 2025-06-24 RX ADMIN — Medication 2000 MILLILITER(S): at 19:56

## 2025-06-24 NOTE — ED PROVIDER NOTE - PHYSICAL EXAMINATION
Gen: No acute distress, non toxic  HEENT: Mucous membranes moist, pink conjunctivae, EOMI. PERRL. Airway patent.   CV: RRR, nl s1/s2.  Resp: CTAB, normal rate and effort. No wheezes, rhonchi, or crackles.   GI: Abdomen soft, NT, ND. No rebound, no guarding  Neuro: A&O x4, MAEx4. 5/5 str ext x 4. Sensation intact, symmetric throughout. No FND's. Gait intact. CN 2-12 intact.   MSK: No midline spinal ttp. No visualized or palpable deformities.  Skin: No rashes, skin intact and well perfused. Cap refill <2sec

## 2025-06-24 NOTE — ED PROVIDER NOTE - OBJECTIVE STATEMENT
40year old PMhx gastric sleeve presents to ED for headache, fatigue. 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 pt reports after work feeling tired, hot, with HA. Pt reports HA as pressure frontal b/l now subsiding. Pt states she felt out of breath during the time she was in bus and getting out of work about 3 hours ago.  Denies fever, chills, dizziness weakness, wheezing, difficulty breathing, SOB, chest pain, abd pain, nausea, vomiting, diarrhea.

## 2025-06-24 NOTE — ED ADULT NURSE NOTE - NSFALLUNIVINTERV_ED_ALL_ED
Bed/Stretcher in lowest position, wheels locked, appropriate side rails in place/Call bell, personal items and telephone in reach/Instruct patient to call for assistance before getting out of bed/chair/stretcher/Non-slip footwear applied when patient is off stretcher/[place]anks to call system/Physically safe environment - no spills, clutter or unnecessary equipment/Purposeful proactive rounding/Room/bathroom lighting operational, light cord in reach

## 2025-06-24 NOTE — ED PROVIDER NOTE - PATIENT PORTAL LINK FT
You can access the FollowMyHealth Patient Portal offered by [facility] by registering at the following website: [email or web address]. By joining [employer]’s FollowMyHealth portal, you will also be able to view your health information using other applications (apps) compatible with our system.

## 2025-06-24 NOTE — ED PROVIDER NOTE - CARE PROVIDER_API CALL
abdominal pain
Gerson Morales  Cardiovascular Disease  [address] [facility], [address] [address]  Phone: [phone]  Fax: [phone]  Follow Up Time:

## 2025-06-24 NOTE — ED PROVIDER NOTE - ATTENDING APP SHARED VISIT CONTRIBUTION OF CARE
Physical Exam  GEN: Awake, alert, non-toxic appearing,   EYES: full EOMI,  ENT: External inspection normal, normal voice,   HEAD: atraumatic  NECK: FROM neck, supple, no meningismus,  RESP: no tachypnea, no hypoxia, no resp distress,  MSK: full ROM of L shoulder but pain w/ shoulder ab/adduction, tender over anterior shoulder, no clavicular tenderness, no trapezius tenderness,   SKIN: cap refill < 2 sec, radial pulses palpable  NEURO: strength 5/5 in LUE, sensation intact, median/ulnar/radial/msc nerves intact
10year old PMhx gastric sleeve presents to ED for headache, fatigue. 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 pt reports after work feeling tired, hot, with HA. labs non acitonable feeling better after fluids meds stable for dc

## 2025-06-24 NOTE — ED PROVIDER NOTE - NSFOLLOWUPINSTRUCTIONS_ED_ALL_ED_FT
Heat Exhaustion    Heat exhaustion happens when your body gets overheated from hot weather or from exercise. It is important to take care of yourself and to treat heat exhaustion as soon as possible. Untreated heat exhaustion can lead to heat cramps, which are the mildest form of heat-related illness. If untreated, heat exhaustion can lead to heat stroke, which is a life-threatening condition that requires emergency care.    What are the causes?  Common causes of this condition include:    Exercising or being active in hot or humid weather.  Exposure to hot or humid weather.  Spending time outdoors in the bright sunshine.  Not drinking enough water.  Being dehydrated.  Being overdressed in warm weather.    What increases the risk?  The following factors may make you more likely to develop this condition:    Exercising beyond your fitness level.  Wearing clothing that does not allow sweat to evaporate.  Drinking a lot of alcoholic beverages or beverages that have caffeine. This can lead to dehydration.  Being age 65 or older.  Being a child.  Having a medical condition such as heart disease, poor circulation, sickle cell disease, or high blood pressure.  Having a fever.  Being very overweight (obese).  Taking certain medicines. These include medicines for high blood pressure, antihistamines, tranquilizers, and illegal drugs such as cocaine and amphetamines.    What are the signs or symptoms?  Symptoms of heat exhaustion include:    Heavy sweating along with feeling weak, dizzy, light-headed, and nauseous.  Rapid heartbeat.  Headache.  Urine that is darker than normal.  Muscle cramps, such as in the leg or side (flank).  Moist, cool, and clammy skin.  Fatigue.  Thirst.  Confusion.  Fainting.    Signs and symptoms may develop suddenly or over time.    How is this diagnosed?  This condition may be diagnosed with a physical exam. The diagnosis is confirmed when core body temperature, measured by a rectal thermometer, reaches 104.0°F degrees or higher.    How is this treated?  This condition may be treated by having:    Cooling blankets placed on you to lower your body temperature.  Fluids given to you through an IV in a hospital.    Follow these instructions at home:     If you think that you have heat exhaustion, call your health care provider. Follow his or her instructions. You should also:    Ask a friend or a family member to stay with you.  Move to a cooler location, such as:    Into the shade.  In front of a fan.  Into an air-conditioned space.  Lie down and rest.  Slowly drink nonalcoholic, caffeine-free fluids.  Take off tight clothing or extra clothing.  Take a cool bath or shower, if possible. If you do not have access to a bath or shower, dab or mist cool water on your skin.  If symptoms last for more than 30 minutes, seek medical care.    Contact a health care provider if:  Symptoms last for more than 30 minutes.    Get help right away if:  You have any symptoms of heat stroke. These include:    Fever.  Vomiting.  Red skin.  Inability to sweat, resulting in hot, dry skin.  Excessive thirst.  Rapid breathing.  Headache.  Confusion or disorientation.  Fainting.  Seizures.    These symptoms may represent a serious problem that is an emergency. Do not wait to see if the symptoms will go away. Get medical help right away. Call your local emergency services (911 in the [place] Do not drive yourself to the hospital.    Summary  Heat exhaustion happens when your body gets overheated from hot weather, exercise, or dehydration.  Symptoms include heavy sweating, confusion, rapid heartbeat, and muscle cramps.  If your symptoms last for more than 30 minutes, contact a health care provider.    ADDITIONAL NOTES AND INSTRUCTIONS    Please follow up with your Primary MD in 24-48 hr.  Seek immediate medical care for any new/worsening signs or symptoms.

## 2025-06-24 NOTE — ED ADULT TRIAGE NOTE - CHIEF COMPLAINT QUOTE
BIBEMS with c/o headache, dizziness and chest pressure after being in the heat all day in her car with no AC. symptoms now resolved.

## 2025-06-24 NOTE — ED ADULT NURSE NOTE - OBJECTIVE STATEMENT
Pt in no apparent distress at this time. Airway patent, breathing spontaneous and nonlabored. Pt A&Ox3 resting in stretcher. Pt c/o       , headache dizzy after working in non ac environment

## 2025-06-24 NOTE — ED PROVIDER NOTE - CLINICAL SUMMARY MEDICAL DECISION MAKING FREE TEXT BOX
40year old PMhx gastric sleeve presents to ED for headache, fatigue. 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 pt reports after work feeling tired, hot, with HA. Pt reports HA as pressure frontal b/l now subsiding. Pt states she felt out of breath during the time she was in bus and getting out of work about 3 hours ago.  Denies fever, chills, dizziness weakness, wheezing, difficulty breathing, SOB, chest pain, abd pain, nausea, vomiting, diarrhea.   Meds, CXR EKG reassess 40year old PMhx gastric sleeve presents to ED for headache, fatigue. 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 pt reports after work feeling tired, hot, with HA. Pt reports HA as pressure frontal b/l now subsiding. Pt states she felt out of breath during the time she was in bus and getting out of work about 3 hours ago.  Denies fever, chills, dizziness weakness, wheezing, difficulty breathing, SOB, chest pain, abd pain, nausea, vomiting, diarrhea.   Meds, CXR EKG reassess  EKG with non specific changes, troponin negative, labs unremarkable    Pt reassessed, pt feeling better at this time, vss, pt able to walk, talk and vocalized plan of action. Discussed in depth and explained to pt in depth the next steps that need to be taken including proper follow up with PCP or specialists. All incidental findings were discussed with pt as well. Pt verbalized their concerns and all questions were answered. Pt understands dispo and wants discharge. Given good instructions when to return to ED, strict return precautions and importance of f/u.

## 2025-07-23 ENCOUNTER — APPOINTMENT (OUTPATIENT)
Dept: OBGYN | Facility: CLINIC | Age: 40
End: 2025-07-23
Payer: COMMERCIAL

## 2025-07-23 VITALS
WEIGHT: 208 LBS | DIASTOLIC BLOOD PRESSURE: 84 MMHG | BODY MASS INDEX: 36.86 KG/M2 | HEIGHT: 63 IN | SYSTOLIC BLOOD PRESSURE: 120 MMHG

## 2025-07-23 DIAGNOSIS — E66.9 OBESITY, UNSPECIFIED: ICD-10-CM

## 2025-07-23 PROCEDURE — 99214 OFFICE O/P EST MOD 30 MIN: CPT

## 2025-07-31 ENCOUNTER — NON-APPOINTMENT (OUTPATIENT)
Age: 40
End: 2025-07-31

## 2025-08-27 ENCOUNTER — APPOINTMENT (OUTPATIENT)
Dept: FAMILY MEDICINE | Facility: CLINIC | Age: 40
End: 2025-08-27

## 2025-09-04 ENCOUNTER — NON-APPOINTMENT (OUTPATIENT)
Age: 40
End: 2025-09-04

## (undated) DEVICE — IRRIGATION TRAY W PISTON SYRINGE 60ML

## (undated) DEVICE — XI VESSEL SEALER

## (undated) DEVICE — DRSG STERISTRIPS 0.5 X 4"

## (undated) DEVICE — VALVE YELLOW PORT SEAL PLUS 5MM

## (undated) DEVICE — ENDOCATCH II 15MM

## (undated) DEVICE — TUBING INSUFFLATION LAP FILTER 10FT

## (undated) DEVICE — SUT POLYSORB 3-0 30" V-20 UNDYED

## (undated) DEVICE — GOWN TRIMAX LG

## (undated) DEVICE — SOL IRR POUR H2O 250ML

## (undated) DEVICE — DRAPE TOWEL BLUE 17" X 24"

## (undated) DEVICE — Device

## (undated) DEVICE — TAPE SILK 3"

## (undated) DEVICE — XI ARM GRASPER TIP UP FENESTRATED

## (undated) DEVICE — XI OBTURATOR OPTICAL BLADELESS 8MM

## (undated) DEVICE — PACK ROBOTIC

## (undated) DEVICE — NDL HYPO SAFE 22G X 1.5" (BLACK)

## (undated) DEVICE — XI ARM NEEDLE DRIVER SUTURECUT MEGA 8MM

## (undated) DEVICE — SOL IRR POUR NS 0.9% 500ML

## (undated) DEVICE — TROCAR COVIDIEN VERSASTEP PLUS 12MM STANDARD

## (undated) DEVICE — DRSG MASTISOL

## (undated) DEVICE — VENODYNE/SCD SLEEVE CALF LARGE

## (undated) DEVICE — XI 12MM AND STAPLER CANNULA SEAL

## (undated) DEVICE — SUT ETHIBOND 0 30" SH

## (undated) DEVICE — ELCTR CORD FOOTSWITCH 1PLR LAPSCP 10FT

## (undated) DEVICE — FORCEP RADIAL JAW 4 W NDL 2.4MM 2.8MM 240CM ORANGE DISP

## (undated) DEVICE — WARMING BLANKET UPPER ADULT

## (undated) DEVICE — XI DRAPE COLUMN

## (undated) DEVICE — INSUFFLATION NDL COVIDIEN STEP 14G FOR STEP/VERSASTEP

## (undated) DEVICE — XI SEAL UNIV 5- 8 MM

## (undated) DEVICE — TUBING STRYKEFLOW II SUCTION / IRRIGATOR

## (undated) DEVICE — VISIGI 3D SLEEVE GASTRECTOMY 36FR

## (undated) DEVICE — SUT BIOSYN 4-0 18" P-12

## (undated) DEVICE — SPECIMEN CONTAINER 100ML

## (undated) DEVICE — XI DRAPE ARM

## (undated) DEVICE — SUT VLOC PBT 0 6" GS-22 BLUE

## (undated) DEVICE — SUT POLYSORB 2-0 30" V-20 UNDYED

## (undated) DEVICE — COVIDIEN SIGNIA POWER CONTROL SHELL

## (undated) DEVICE — POSITIONER FOAM EGG CRATE ULNAR 2PCS (PINK)

## (undated) DEVICE — SUT POLYSORB 0 30" GS-23

## (undated) DEVICE — PREP CHLORAPREP HI-LITE ORANGE 26ML

## (undated) DEVICE — GLV 8 PROTEXIS (WHITE)

## (undated) DEVICE — XI ARM FORCEP CADIERE 8MM

## (undated) DEVICE — SHEARS COVIDIEN ENDO SHEAR 5MM X 45CM LONG W MONOPOLAR CAUTERY

## (undated) DEVICE — TROCAR COVIDIEN VERSASTEP PLUS 15MM STANDARD